# Patient Record
Sex: MALE | Race: BLACK OR AFRICAN AMERICAN
[De-identification: names, ages, dates, MRNs, and addresses within clinical notes are randomized per-mention and may not be internally consistent; named-entity substitution may affect disease eponyms.]

---

## 2018-01-24 PROBLEM — Z00.00 ENCOUNTER FOR PREVENTIVE HEALTH EXAMINATION: Status: ACTIVE | Noted: 2018-01-24

## 2018-01-30 ENCOUNTER — APPOINTMENT (OUTPATIENT)
Dept: INFECTIOUS DISEASE | Facility: CLINIC | Age: 33
End: 2018-01-30
Payer: COMMERCIAL

## 2018-01-30 VITALS
OXYGEN SATURATION: 95 % | BODY MASS INDEX: 30.51 KG/M2 | HEART RATE: 101 BPM | DIASTOLIC BLOOD PRESSURE: 96 MMHG | WEIGHT: 206 LBS | HEIGHT: 69 IN | SYSTOLIC BLOOD PRESSURE: 137 MMHG | RESPIRATION RATE: 18 BRPM | TEMPERATURE: 97.7 F

## 2018-01-30 DIAGNOSIS — Z83.3 FAMILY HISTORY OF DIABETES MELLITUS: ICD-10-CM

## 2018-01-30 PROCEDURE — 90472 IMMUNIZATION ADMIN EACH ADD: CPT

## 2018-01-30 PROCEDURE — 90670 PCV13 VACCINE IM: CPT

## 2018-01-30 PROCEDURE — 90686 IIV4 VACC NO PRSV 0.5 ML IM: CPT

## 2018-01-30 PROCEDURE — G0008: CPT

## 2018-01-30 PROCEDURE — 99204 OFFICE O/P NEW MOD 45 MIN: CPT | Mod: 25

## 2018-02-06 ENCOUNTER — CLINICAL ADVICE (OUTPATIENT)
Age: 33
End: 2018-02-06

## 2018-02-09 ENCOUNTER — LABORATORY RESULT (OUTPATIENT)
Age: 33
End: 2018-02-09

## 2018-02-09 ENCOUNTER — APPOINTMENT (OUTPATIENT)
Dept: INFECTIOUS DISEASE | Facility: CLINIC | Age: 33
End: 2018-02-09
Payer: COMMERCIAL

## 2018-02-09 VITALS
TEMPERATURE: 97.5 F | WEIGHT: 212 LBS | HEIGHT: 69 IN | OXYGEN SATURATION: 99 % | HEART RATE: 109 BPM | DIASTOLIC BLOOD PRESSURE: 104 MMHG | SYSTOLIC BLOOD PRESSURE: 150 MMHG | BODY MASS INDEX: 31.4 KG/M2

## 2018-02-09 PROCEDURE — 96372 THER/PROPH/DIAG INJ SC/IM: CPT

## 2018-02-09 PROCEDURE — 99214 OFFICE O/P EST MOD 30 MIN: CPT | Mod: 25

## 2018-02-09 RX ORDER — PENICILLIN G BENZATHINE 2400000 [IU]/4ML
2400000 INJECTION, SUSPENSION INTRAMUSCULAR
Qty: 1 | Refills: 0 | Status: COMPLETED | OUTPATIENT
Start: 2018-02-09

## 2018-02-09 RX ADMIN — PENICILLIN G BENZATHINE 0 UNIT/4ML: 2400000 INJECTION, SUSPENSION INTRAMUSCULAR at 00:00

## 2018-02-09 RX ADMIN — Medication 2.4 UNIT/4ML: at 00:00

## 2018-02-16 ENCOUNTER — APPOINTMENT (OUTPATIENT)
Dept: INFECTIOUS DISEASE | Facility: CLINIC | Age: 33
End: 2018-02-16
Payer: COMMERCIAL

## 2018-02-16 ENCOUNTER — APPOINTMENT (OUTPATIENT)
Dept: INFECTIOUS DISEASE | Facility: CLINIC | Age: 33
End: 2018-02-16

## 2018-02-16 VITALS
TEMPERATURE: 97.5 F | DIASTOLIC BLOOD PRESSURE: 101 MMHG | HEIGHT: 69 IN | SYSTOLIC BLOOD PRESSURE: 154 MMHG | BODY MASS INDEX: 32.14 KG/M2 | HEART RATE: 114 BPM | OXYGEN SATURATION: 100 % | WEIGHT: 217 LBS

## 2018-02-16 PROCEDURE — 96372 THER/PROPH/DIAG INJ SC/IM: CPT

## 2018-02-16 PROCEDURE — 99213 OFFICE O/P EST LOW 20 MIN: CPT | Mod: 25

## 2018-02-16 RX ORDER — PENICILLIN G BENZATHINE 2400000 [IU]/4ML
2400000 INJECTION, SUSPENSION INTRAMUSCULAR
Qty: 1 | Refills: 0 | Status: COMPLETED | OUTPATIENT
Start: 2018-02-09

## 2018-02-23 ENCOUNTER — APPOINTMENT (OUTPATIENT)
Dept: INFECTIOUS DISEASE | Facility: CLINIC | Age: 33
End: 2018-02-23
Payer: COMMERCIAL

## 2018-02-23 ENCOUNTER — MED ADMIN CHARGE (OUTPATIENT)
Age: 33
End: 2018-02-23

## 2018-02-23 PROCEDURE — 96372 THER/PROPH/DIAG INJ SC/IM: CPT

## 2018-02-23 RX ORDER — PENICILLIN G BENZATHINE 2400000 [IU]/4ML
2400000 INJECTION, SUSPENSION INTRAMUSCULAR
Qty: 1 | Refills: 0 | Status: COMPLETED | OUTPATIENT
Start: 2018-02-23

## 2018-04-17 ENCOUNTER — LABORATORY RESULT (OUTPATIENT)
Age: 33
End: 2018-04-17

## 2018-04-17 ENCOUNTER — APPOINTMENT (OUTPATIENT)
Dept: INFECTIOUS DISEASE | Facility: CLINIC | Age: 33
End: 2018-04-17
Payer: COMMERCIAL

## 2018-04-17 ENCOUNTER — RX RENEWAL (OUTPATIENT)
Age: 33
End: 2018-04-17

## 2018-04-17 VITALS
OXYGEN SATURATION: 99 % | WEIGHT: 238 LBS | HEIGHT: 69 IN | TEMPERATURE: 97.7 F | HEART RATE: 83 BPM | BODY MASS INDEX: 35.25 KG/M2 | SYSTOLIC BLOOD PRESSURE: 135 MMHG | DIASTOLIC BLOOD PRESSURE: 87 MMHG

## 2018-04-17 PROCEDURE — 90734 MENACWYD/MENACWYCRM VACC IM: CPT

## 2018-04-17 PROCEDURE — 99213 OFFICE O/P EST LOW 20 MIN: CPT | Mod: 25

## 2018-04-17 PROCEDURE — 90471 IMMUNIZATION ADMIN: CPT

## 2018-04-17 RX ORDER — SULFAMETHOXAZOLE AND TRIMETHOPRIM 800; 160 MG/1; MG/1
800-160 TABLET ORAL
Qty: 12 | Refills: 3 | Status: DISCONTINUED | COMMUNITY
Start: 2018-02-09 | End: 2018-04-17

## 2018-04-19 ENCOUNTER — CLINICAL ADVICE (OUTPATIENT)
Age: 33
End: 2018-04-19

## 2018-04-19 ENCOUNTER — RX RENEWAL (OUTPATIENT)
Age: 33
End: 2018-04-19

## 2018-06-12 ENCOUNTER — RX RENEWAL (OUTPATIENT)
Age: 33
End: 2018-06-12

## 2018-07-30 ENCOUNTER — LABORATORY RESULT (OUTPATIENT)
Age: 33
End: 2018-07-30

## 2018-07-30 ENCOUNTER — APPOINTMENT (OUTPATIENT)
Dept: INFECTIOUS DISEASE | Facility: CLINIC | Age: 33
End: 2018-07-30
Payer: COMMERCIAL

## 2018-07-30 VITALS
HEART RATE: 84 BPM | BODY MASS INDEX: 38.51 KG/M2 | WEIGHT: 260 LBS | TEMPERATURE: 97.9 F | DIASTOLIC BLOOD PRESSURE: 85 MMHG | OXYGEN SATURATION: 99 % | SYSTOLIC BLOOD PRESSURE: 136 MMHG | HEIGHT: 69 IN

## 2018-07-30 PROCEDURE — 99214 OFFICE O/P EST MOD 30 MIN: CPT

## 2018-08-13 ENCOUNTER — MED ADMIN CHARGE (OUTPATIENT)
Age: 33
End: 2018-08-13

## 2018-08-13 ENCOUNTER — APPOINTMENT (OUTPATIENT)
Dept: INFECTIOUS DISEASE | Facility: CLINIC | Age: 33
End: 2018-08-13
Payer: COMMERCIAL

## 2018-08-13 PROCEDURE — 96372 THER/PROPH/DIAG INJ SC/IM: CPT

## 2018-08-13 RX ORDER — PENICILLIN G BENZATHINE 2400000 [IU]/4ML
2400000 INJECTION, SUSPENSION INTRAMUSCULAR
Qty: 1 | Refills: 0 | Status: COMPLETED | OUTPATIENT
Start: 2018-08-01

## 2018-08-29 ENCOUNTER — MED ADMIN CHARGE (OUTPATIENT)
Age: 33
End: 2018-08-29

## 2018-08-29 ENCOUNTER — APPOINTMENT (OUTPATIENT)
Dept: INFECTIOUS DISEASE | Facility: CLINIC | Age: 33
End: 2018-08-29
Payer: COMMERCIAL

## 2018-08-29 PROCEDURE — 96372 THER/PROPH/DIAG INJ SC/IM: CPT

## 2018-08-29 RX ORDER — PENICILLIN G BENZATHINE 2400000 [IU]/4ML
2400000 INJECTION, SUSPENSION INTRAMUSCULAR
Qty: 1 | Refills: 0 | Status: COMPLETED | OUTPATIENT
Start: 2018-08-01

## 2018-09-06 ENCOUNTER — APPOINTMENT (OUTPATIENT)
Dept: INFECTIOUS DISEASE | Facility: CLINIC | Age: 33
End: 2018-09-06
Payer: COMMERCIAL

## 2018-09-06 PROCEDURE — 96372 THER/PROPH/DIAG INJ SC/IM: CPT

## 2018-09-06 RX ORDER — PENICILLIN G BENZATHINE 2400000 [IU]/4ML
2400000 INJECTION, SUSPENSION INTRAMUSCULAR
Qty: 1 | Refills: 0 | Status: COMPLETED | OUTPATIENT
Start: 2018-08-01

## 2018-09-13 ENCOUNTER — APPOINTMENT (OUTPATIENT)
Dept: INFECTIOUS DISEASE | Facility: CLINIC | Age: 33
End: 2018-09-13
Payer: COMMERCIAL

## 2018-09-13 ENCOUNTER — RX RENEWAL (OUTPATIENT)
Age: 33
End: 2018-09-13

## 2018-09-13 PROCEDURE — 96372 THER/PROPH/DIAG INJ SC/IM: CPT

## 2018-09-13 RX ORDER — PENICILLIN G BENZATHINE 2400000 [IU]/4ML
2400000 INJECTION, SUSPENSION INTRAMUSCULAR
Qty: 1 | Refills: 0 | Status: COMPLETED | OUTPATIENT
Start: 2018-08-21

## 2018-10-29 ENCOUNTER — APPOINTMENT (OUTPATIENT)
Dept: INFECTIOUS DISEASE | Facility: CLINIC | Age: 33
End: 2018-10-29
Payer: COMMERCIAL

## 2018-10-29 ENCOUNTER — RX RENEWAL (OUTPATIENT)
Age: 33
End: 2018-10-29

## 2018-10-29 VITALS
DIASTOLIC BLOOD PRESSURE: 89 MMHG | HEART RATE: 88 BPM | OXYGEN SATURATION: 96 % | BODY MASS INDEX: 42.21 KG/M2 | TEMPERATURE: 97.7 F | SYSTOLIC BLOOD PRESSURE: 140 MMHG | WEIGHT: 285 LBS | HEIGHT: 69 IN

## 2018-10-29 PROCEDURE — G0008: CPT

## 2018-10-29 PROCEDURE — 99214 OFFICE O/P EST MOD 30 MIN: CPT | Mod: 25

## 2018-10-29 PROCEDURE — 90686 IIV4 VACC NO PRSV 0.5 ML IM: CPT

## 2018-10-29 PROCEDURE — 90732 PPSV23 VACC 2 YRS+ SUBQ/IM: CPT

## 2018-10-29 PROCEDURE — 90472 IMMUNIZATION ADMIN EACH ADD: CPT

## 2018-10-31 ENCOUNTER — CLINICAL ADVICE (OUTPATIENT)
Age: 33
End: 2018-10-31

## 2018-11-15 ENCOUNTER — CLINICAL ADVICE (OUTPATIENT)
Age: 33
End: 2018-11-15

## 2019-01-11 ENCOUNTER — RX RENEWAL (OUTPATIENT)
Age: 34
End: 2019-01-11

## 2019-01-11 RX ORDER — ATOVAQUONE 750 MG/5ML
750 SUSPENSION ORAL
Qty: 1 | Refills: 0 | Status: COMPLETED | COMMUNITY
Start: 2018-04-17 | End: 2019-01-11

## 2019-02-06 ENCOUNTER — RX RENEWAL (OUTPATIENT)
Age: 34
End: 2019-02-06

## 2019-05-10 ENCOUNTER — RX RENEWAL (OUTPATIENT)
Age: 34
End: 2019-05-10

## 2019-05-14 ENCOUNTER — APPOINTMENT (OUTPATIENT)
Dept: INFECTIOUS DISEASE | Facility: CLINIC | Age: 34
End: 2019-05-14
Payer: COMMERCIAL

## 2019-05-14 VITALS
TEMPERATURE: 98.6 F | DIASTOLIC BLOOD PRESSURE: 94 MMHG | SYSTOLIC BLOOD PRESSURE: 155 MMHG | HEART RATE: 85 BPM | WEIGHT: 306 LBS | BODY MASS INDEX: 45.19 KG/M2 | OXYGEN SATURATION: 97 %

## 2019-05-14 DIAGNOSIS — Z72.51 HIGH RISK HETEROSEXUAL BEHAVIOR: ICD-10-CM

## 2019-05-14 PROCEDURE — 90715 TDAP VACCINE 7 YRS/> IM: CPT

## 2019-05-14 PROCEDURE — 90471 IMMUNIZATION ADMIN: CPT

## 2019-05-14 PROCEDURE — 99214 OFFICE O/P EST MOD 30 MIN: CPT | Mod: 25

## 2019-05-14 RX ORDER — ELVITEGRAVIR, COBICISTAT, EMTRICITABINE, AND TENOFOVIR ALAFENAMIDE 150; 150; 200; 10 MG/1; MG/1; MG/1; MG/1
150-150-200-10 TABLET ORAL
Qty: 30 | Refills: 0 | Status: DISCONTINUED | COMMUNITY
Start: 2018-01-30 | End: 2019-05-14

## 2019-05-14 NOTE — HISTORY OF PRESENT ILLNESS
[Sexually Active] : The patient is sexually active [Oral] : oral [Anal] : anal [Men] : with men [FreeTextEntry1] : 32 yo man who presented with advanced AIds and multiple OIs about 18 months ago. He has been steadily improving since starting ARVs\par He has been taking Genvoya without side effects noted\par \par He has a history of confirmed crysptosporidial diarrhea- sxs. improved with ARVS\par \par He has a history of syphilis and is s/p treatment and will need repeat titers\par \par He drives long haul 18wheel trucks\par \par C/o weight gain over 300lbs at this point [Monogamous] : is not monogamous [de-identified] :  [de-identified] : mom

## 2019-05-14 NOTE — ASSESSMENT
[Treatment Education] : treatment education [Rx Dose / Side Effects] : Rx dose/side effects [Treatment Adherence] : treatment adherence [Risk Reduction] : risk reduction [Universal Precautions] : universal precautions [Nutritional / Food Issues] : nutritional/food issues [Medical Care Issues] : medical care issues [Drug Interactions / Side Effects] : drug interactions/side effects [Disclosure of Diagnosis] : disclosure of diagnosis [HIV Education] : HIV Education [Sexuality / Safer Sex] : sexuality/safer sex [Partner Notification Info/Discussion] : partner notification info/discussion [EtOH Counseling] : EtOH counseling [Anticipatory Guidance] : anticipatory guidance [Smoking Cessation] : smoking cessation [FreeTextEntry1] : 32 yo man with a history of a recent presentation of advanced AIDs, but steadily improving with ARVs and OI meds\par \par Will try to switch Genvoya to Biktarvy\par \par Poor dentition needs multiple tooth extractions- dental referral has a private dentist\par \par HIV- needs repeat labs today CBc, Cd4, VL, SMAC\par annual labs as well and titers for HAV, HBV, measles\par \par RPR- s/p treatment x 4 doses will need repeat titer today and may need further treatment given CD4\par \par cryptosporidial diarrhea- sxs. improved with ARVs no proven effective primary therapy but if sxs. recur will try Nitazoxinide\par \par needs Tdap\par \par needs nutritionist referral for diet and exercise weight loss program\par \par RPR still at 1:32- Patient was  unable to RTC for PCN injections will try oral doxycycline 100mg bid x 28days  and will see what titer shows today\par \par RTC in 4 mo

## 2019-05-14 NOTE — PHYSICAL EXAM
[General Appearance - Alert] : alert [General Appearance - In No Acute Distress] : in no acute distress [Sclera] : the sclera and conjunctiva were normal [PERRL With Normal Accommodation] : pupils were equal in size, round, reactive to light [Extraocular Movements] : extraocular movements were intact [Oropharynx] : the oropharynx was normal with no thrush [Outer Ear] : the ears and nose were normal in appearance [Neck Appearance] : the appearance of the neck was normal [Neck Cervical Mass (___cm)] : no neck mass was observed [Thyroid Diffuse Enlargement] : the thyroid was not enlarged [Jugular Venous Distention Increased] : there was no jugular-venous distention [Heart Rate And Rhythm] : heart rate was normal and rhythm regular [Auscultation Breath Sounds / Voice Sounds] : lungs were clear to auscultation bilaterally [Heart Sounds] : normal S1 and S2 [Heart Sounds Gallop] : no gallops [Murmurs] : no murmurs [Heart Sounds Pericardial Friction Rub] : no pericardial rub [Edema] : there was no peripheral edema [Full Pulse] : the pedal pulses are present [Bowel Sounds] : normal bowel sounds [Abdomen Soft] : soft [Abdomen Tenderness] : non-tender [Abdomen Mass (___ Cm)] : no abdominal mass palpated [Costovertebral Angle Tenderness] : no CVA tenderness [Penis Abnormality] : the penis was normal [Scrotum] : the scrotum was normal [Testes Swelling] : the testicles were not swollen [Testes Mass (___cm)] : there were no testicular masses [No Palpable Adenopathy] : no palpable adenopathy [Musculoskeletal - Swelling] : no joint swelling [Nail Clubbing] : no clubbing  or cyanosis of the fingernails [Motor Tone] : muscle strength and tone were normal [Skin Color & Pigmentation] : normal skin color and pigmentation [Deep Tendon Reflexes (DTR)] : deep tendon reflexes were 2+ and symmetric [] : no rash [Sensation] : the sensory exam was normal to light touch and pinprick [No Focal Deficits] : no focal deficits [Oriented To Time, Place, And Person] : oriented to person, place, and time [Affect] : the affect was normal

## 2019-05-16 ENCOUNTER — CLINICAL ADVICE (OUTPATIENT)
Age: 34
End: 2019-05-16

## 2019-05-24 ENCOUNTER — RX RENEWAL (OUTPATIENT)
Age: 34
End: 2019-05-24

## 2019-05-31 ENCOUNTER — APPOINTMENT (OUTPATIENT)
Dept: INFECTIOUS DISEASE | Facility: CLINIC | Age: 34
End: 2019-05-31

## 2019-06-10 ENCOUNTER — RX RENEWAL (OUTPATIENT)
Age: 34
End: 2019-06-10

## 2019-07-29 ENCOUNTER — RX RENEWAL (OUTPATIENT)
Age: 34
End: 2019-07-29

## 2019-09-03 ENCOUNTER — LABORATORY RESULT (OUTPATIENT)
Age: 34
End: 2019-09-03

## 2019-09-03 ENCOUNTER — APPOINTMENT (OUTPATIENT)
Dept: INFECTIOUS DISEASE | Facility: CLINIC | Age: 34
End: 2019-09-03
Payer: COMMERCIAL

## 2019-09-03 VITALS
RESPIRATION RATE: 18 BRPM | SYSTOLIC BLOOD PRESSURE: 145 MMHG | HEART RATE: 78 BPM | DIASTOLIC BLOOD PRESSURE: 96 MMHG | TEMPERATURE: 97.7 F | WEIGHT: 312 LBS | HEIGHT: 69 IN | OXYGEN SATURATION: 94 % | BODY MASS INDEX: 46.21 KG/M2

## 2019-09-03 PROCEDURE — 99214 OFFICE O/P EST MOD 30 MIN: CPT

## 2019-09-03 RX ORDER — LOSARTAN POTASSIUM 50 MG/1
50 TABLET, FILM COATED ORAL
Qty: 30 | Refills: 3 | Status: DISCONTINUED | COMMUNITY
Start: 2019-09-03 | End: 2019-09-03

## 2019-09-03 NOTE — PHYSICAL EXAM
[General Appearance - Alert] : alert [General Appearance - In No Acute Distress] : in no acute distress [Sclera] : the sclera and conjunctiva were normal [PERRL With Normal Accommodation] : pupils were equal in size, round, reactive to light [Extraocular Movements] : extraocular movements were intact [Outer Ear] : the ears and nose were normal in appearance [Oropharynx] : the oropharynx was normal with no thrush [Neck Appearance] : the appearance of the neck was normal [Neck Cervical Mass (___cm)] : no neck mass was observed [Jugular Venous Distention Increased] : there was no jugular-venous distention [Thyroid Diffuse Enlargement] : the thyroid was not enlarged [Auscultation Breath Sounds / Voice Sounds] : lungs were clear to auscultation bilaterally [Heart Sounds] : normal S1 and S2 [Heart Rate And Rhythm] : heart rate was normal and rhythm regular [Heart Sounds Gallop] : no gallops [Murmurs] : no murmurs [Heart Sounds Pericardial Friction Rub] : no pericardial rub [Full Pulse] : the pedal pulses are present [Bowel Sounds] : normal bowel sounds [Edema] : there was no peripheral edema [Abdomen Soft] : soft [Abdomen Tenderness] : non-tender [Abdomen Mass (___ Cm)] : no abdominal mass palpated [Costovertebral Angle Tenderness] : no CVA tenderness [Penis Abnormality] : the penis was normal [Testes Swelling] : the testicles were not swollen [Scrotum] : the scrotum was normal [Testes Mass (___cm)] : there were no testicular masses [No Palpable Adenopathy] : no palpable adenopathy [Musculoskeletal - Swelling] : no joint swelling [Nail Clubbing] : no clubbing  or cyanosis of the fingernails [Motor Tone] : muscle strength and tone were normal [Skin Color & Pigmentation] : normal skin color and pigmentation [] : no rash [Deep Tendon Reflexes (DTR)] : deep tendon reflexes were 2+ and symmetric [No Focal Deficits] : no focal deficits [Sensation] : the sensory exam was normal to light touch and pinprick [Oriented To Time, Place, And Person] : oriented to person, place, and time [Affect] : the affect was normal

## 2019-09-04 NOTE — HISTORY OF PRESENT ILLNESS
[Sexually Active] : The patient is sexually active [Oral] : oral [Anal] : anal [Men] : with men [FreeTextEntry1] : 32 yo man who presented with advanced AIds and multiple OIs about 18 months ago. He has been steadily improving since starting ARVs\par He has been taking Genvoya without side effects noted\par \par He has a history of confirmed crysptosporidial diarrhea- sxs. improved with ARVS\par \par He has a history of syphilis and is s/p treatment and will need repeat titers\par \par He drives long haul 18wheel trucks\par \par C/o weight gain over 300lbs at this point [Monogamous] : is not monogamous [de-identified] :  [de-identified] : mom

## 2019-09-04 NOTE — ASSESSMENT
[Treatment Education] : treatment education [Treatment Adherence] : treatment adherence [Rx Dose / Side Effects] : Rx dose/side effects [Risk Reduction] : risk reduction [Nutritional / Food Issues] : nutritional/food issues [Universal Precautions] : universal precautions [Medical Care Issues] : medical care issues [Disclosure of Diagnosis] : disclosure of diagnosis [Drug Interactions / Side Effects] : drug interactions/side effects [HIV Education] : HIV Education [Sexuality / Safer Sex] : sexuality/safer sex [Anticipatory Guidance] : anticipatory guidance [Education Materials Provided] : Eduction materials were provided [FreeTextEntry1] : 32 yo man with a history of a recent presentation of advanced AIDs, but steadily improving with ARVs and OI meds\par \par Will try to switch Genvoya to Biktarvy\par \par Poor dentition needs multiple tooth extractions- dental referral has a private dentist\par \par HIV- needs repeat labs today CBc, Cd4, VL, SMAC\par annual labs as well and titers for HAV, HBV, measles\par \par RPR- s/p treatment x 4 doses will need repeat titer today and may need further treatment given CD4\par \par cryptosporidial diarrhea- sxs. improved with ARVs no proven effective primary therapy but if sxs. recur will try Nitazoxinide\par \par HTN- needs diet and exercise and weight loss will see nutritiionist today but will also start a BP med Losartan-hctz and repeat BP in 1 week. If not effective at reducing will need to try norvasc agent\par \par needs nutritionist referral for diet and exercise weight loss program\par \par RPR still at 1:16- Patient was  unable to RTC for PCN injections due to work schedule was given oral doxycycline 100mg bid x 28days  and will see what titer shows today\par \par RTC in 4 mo

## 2019-09-16 ENCOUNTER — CLINICAL ADVICE (OUTPATIENT)
Age: 34
End: 2019-09-16

## 2019-09-20 ENCOUNTER — RX RENEWAL (OUTPATIENT)
Age: 34
End: 2019-09-20

## 2019-11-01 ENCOUNTER — APPOINTMENT (OUTPATIENT)
Dept: INFECTIOUS DISEASE | Facility: CLINIC | Age: 34
End: 2019-11-01

## 2019-11-25 ENCOUNTER — RX RENEWAL (OUTPATIENT)
Age: 34
End: 2019-11-25

## 2020-01-13 ENCOUNTER — APPOINTMENT (OUTPATIENT)
Dept: INFECTIOUS DISEASE | Facility: CLINIC | Age: 35
End: 2020-01-13

## 2020-01-24 ENCOUNTER — RX RENEWAL (OUTPATIENT)
Age: 35
End: 2020-01-24

## 2020-02-23 ENCOUNTER — RX RENEWAL (OUTPATIENT)
Age: 35
End: 2020-02-23

## 2020-03-26 ENCOUNTER — RX RENEWAL (OUTPATIENT)
Age: 35
End: 2020-03-26

## 2020-04-17 ENCOUNTER — RX RENEWAL (OUTPATIENT)
Age: 35
End: 2020-04-17

## 2020-04-29 ENCOUNTER — APPOINTMENT (OUTPATIENT)
Dept: INFECTIOUS DISEASE | Facility: CLINIC | Age: 35
End: 2020-04-29

## 2020-04-29 ENCOUNTER — APPOINTMENT (OUTPATIENT)
Dept: INFECTIOUS DISEASE | Facility: CLINIC | Age: 35
End: 2020-04-29
Payer: COMMERCIAL

## 2020-04-29 DIAGNOSIS — E55.9 VITAMIN D DEFICIENCY, UNSPECIFIED: ICD-10-CM

## 2020-04-29 DIAGNOSIS — R73.09 OTHER ABNORMAL GLUCOSE: ICD-10-CM

## 2020-04-29 DIAGNOSIS — A53.9 SYPHILIS, UNSPECIFIED: ICD-10-CM

## 2020-04-29 PROCEDURE — 99442: CPT

## 2020-05-18 ENCOUNTER — RX RENEWAL (OUTPATIENT)
Age: 35
End: 2020-05-18

## 2020-07-20 ENCOUNTER — APPOINTMENT (OUTPATIENT)
Dept: INFECTIOUS DISEASE | Facility: CLINIC | Age: 35
End: 2020-07-20

## 2020-09-04 ENCOUNTER — RX RENEWAL (OUTPATIENT)
Age: 35
End: 2020-09-04

## 2020-09-13 ENCOUNTER — FORM ENCOUNTER (OUTPATIENT)
Age: 35
End: 2020-09-13

## 2020-09-14 ENCOUNTER — APPOINTMENT (OUTPATIENT)
Dept: INFECTIOUS DISEASE | Facility: CLINIC | Age: 35
End: 2020-09-14
Payer: COMMERCIAL

## 2020-09-14 VITALS
OXYGEN SATURATION: 19 % | WEIGHT: 296 LBS | TEMPERATURE: 97.7 F | BODY MASS INDEX: 43.71 KG/M2 | HEART RATE: 99 BPM | DIASTOLIC BLOOD PRESSURE: 96 MMHG | SYSTOLIC BLOOD PRESSURE: 160 MMHG

## 2020-09-14 DIAGNOSIS — Z23 ENCOUNTER FOR IMMUNIZATION: ICD-10-CM

## 2020-09-14 PROCEDURE — 99214 OFFICE O/P EST MOD 30 MIN: CPT

## 2020-09-14 NOTE — PHYSICAL EXAM
[General Appearance - Alert] : alert [General Appearance - In No Acute Distress] : in no acute distress [Sclera] : the sclera and conjunctiva were normal [PERRL With Normal Accommodation] : pupils were equal in size, round, reactive to light [Extraocular Movements] : extraocular movements were intact [Outer Ear] : the ears and nose were normal in appearance [Oropharynx] : the oropharynx was normal with no thrush [Neck Appearance] : the appearance of the neck was normal [Neck Cervical Mass (___cm)] : no neck mass was observed [Jugular Venous Distention Increased] : there was no jugular-venous distention [Thyroid Diffuse Enlargement] : the thyroid was not enlarged [Heart Rate And Rhythm] : heart rate was normal and rhythm regular [Auscultation Breath Sounds / Voice Sounds] : lungs were clear to auscultation bilaterally [Heart Sounds Gallop] : no gallops [Heart Sounds] : normal S1 and S2 [Heart Sounds Pericardial Friction Rub] : no pericardial rub [Murmurs] : no murmurs [Full Pulse] : the pedal pulses are present [Edema] : there was no peripheral edema [Bowel Sounds] : normal bowel sounds [Abdomen Soft] : soft [Abdomen Tenderness] : non-tender [Abdomen Mass (___ Cm)] : no abdominal mass palpated [Costovertebral Angle Tenderness] : no CVA tenderness [Penis Abnormality] : the penis was normal [Scrotum] : the scrotum was normal [Testes Swelling] : the testicles were not swollen [Testes Mass (___cm)] : there were no testicular masses [No Palpable Adenopathy] : no palpable adenopathy [Musculoskeletal - Swelling] : no joint swelling [Nail Clubbing] : no clubbing  or cyanosis of the fingernails [Motor Tone] : muscle strength and tone were normal [Skin Color & Pigmentation] : normal skin color and pigmentation [] : no rash [Deep Tendon Reflexes (DTR)] : deep tendon reflexes were 2+ and symmetric [Sensation] : the sensory exam was normal to light touch and pinprick [No Focal Deficits] : no focal deficits [Oriented To Time, Place, And Person] : oriented to person, place, and time [Affect] : the affect was normal

## 2020-09-16 NOTE — HISTORY OF PRESENT ILLNESS
[Sexually Active] : The patient is sexually active [Oral] : oral [Anal] : anal [Men] : with men [FreeTextEntry1] : 34 yo man who presented with advanced AIds and multiple OIs a few years ago. He has been steadily improving since starting ARVs\par He has been taking Biktarvy without side effects noted\par \par He has a history of confirmed crysptosporidial diarrhea- sxs. improved with ARVS\par \par He has a history of syphilis and is s/p treatment and will need repeat titers\par \par He drives long haul 18wheel trucks\par \par C/o weight gain over 300lbs at this point [Monogamous] : is not monogamous [de-identified] :  [de-identified] : mom

## 2020-09-16 NOTE — ASSESSMENT
[Treatment Education] : treatment education [Treatment Adherence] : treatment adherence [Rx Dose / Side Effects] : Rx dose/side effects [Risk Reduction] : risk reduction [Nutritional / Food Issues] : nutritional/food issues [Universal Precautions] : universal precautions [Medical Care Issues] : medical care issues [Drug Interactions / Side Effects] : drug interactions/side effects [Disclosure of Diagnosis] : disclosure of diagnosis [HIV Education] : HIV Education [Sexuality / Safer Sex] : sexuality/safer sex [Anticipatory Guidance] : anticipatory guidance [Education Materials Provided] : Eduction materials were provided [FreeTextEntry1] : 36 yo man with a history of a recent presentation of advanced AIDs, but steadily improving with ARVs and OI meds\par \par Will continue Biktarvy\par \par Poor dentition needs multiple tooth extractions- dental referral has a private dentist\par \par HIV- needs repeat labs today CBc, Cd4, VL, SMAC\par annual labs as well and titers for HAV, HBV, measles\par flu vaccine\par \par RPR- s/p treatment x 4 doses will need repeat titer today to monitor and for STI reinfection\par \par cryptosporidial diarrhea- resolved\par \par HTN- needs diet and exercise and weight loss will see nutritiionist today but will also start a BP med Losartan-hctz and repeat BP at home or in clinic in a few weeks\par \par needs nutritionist referral for diet and exercise weight loss program\par \par COVID risks and avoidance discussed\par \par RTC in 4 mo

## 2020-09-17 RX ORDER — AZITHROMYCIN 600 MG/1
600 TABLET, FILM COATED ORAL
Qty: 8 | Refills: 0 | Status: DISCONTINUED | COMMUNITY
Start: 2018-02-09 | End: 2020-09-17

## 2020-09-23 ENCOUNTER — INPATIENT (INPATIENT)
Facility: HOSPITAL | Age: 35
LOS: 2 days | Discharge: HOME | DRG: 638 | End: 2020-09-26
Attending: INTERNAL MEDICINE | Admitting: INTERNAL MEDICINE
Payer: COMMERCIAL

## 2020-09-23 VITALS
WEIGHT: 283.96 LBS | DIASTOLIC BLOOD PRESSURE: 92 MMHG | HEIGHT: 70 IN | HEART RATE: 102 BPM | SYSTOLIC BLOOD PRESSURE: 144 MMHG | RESPIRATION RATE: 20 BRPM | OXYGEN SATURATION: 97 % | TEMPERATURE: 98 F

## 2020-09-23 DIAGNOSIS — E11.10 TYPE 2 DIABETES MELLITUS WITH KETOACIDOSIS WITHOUT COMA: ICD-10-CM

## 2020-09-23 DIAGNOSIS — E78.49 OTHER HYPERLIPIDEMIA: ICD-10-CM

## 2020-09-23 DIAGNOSIS — I10 ESSENTIAL (PRIMARY) HYPERTENSION: ICD-10-CM

## 2020-09-23 DIAGNOSIS — R73.9 HYPERGLYCEMIA, UNSPECIFIED: ICD-10-CM

## 2020-09-23 LAB
A1C WITH ESTIMATED AVERAGE GLUCOSE RESULT: 11.4 % — HIGH (ref 4–5.6)
ALBUMIN SERPL ELPH-MCNC: 4.9 G/DL — SIGNIFICANT CHANGE UP (ref 3.3–5)
ALP SERPL-CCNC: 88 U/L — SIGNIFICANT CHANGE UP (ref 40–120)
ALT FLD-CCNC: 32 U/L — SIGNIFICANT CHANGE UP (ref 10–45)
ANION GAP SERPL CALC-SCNC: 18 MMOL/L — HIGH (ref 5–17)
APPEARANCE UR: CLEAR — SIGNIFICANT CHANGE UP
AST SERPL-CCNC: 56 U/L — HIGH (ref 10–40)
B-OH-BUTYR SERPL-SCNC: 1.6 MMOL/L — HIGH
B-OH-BUTYR SERPL-SCNC: 1.8 MMOL/L — HIGH
BACTERIA # UR AUTO: NEGATIVE — SIGNIFICANT CHANGE UP
BASE EXCESS BLDV CALC-SCNC: 0.4 MMOL/L — SIGNIFICANT CHANGE UP (ref -2–2)
BASOPHILS # BLD AUTO: 0.04 K/UL — SIGNIFICANT CHANGE UP (ref 0–0.2)
BASOPHILS NFR BLD AUTO: 0.4 % — SIGNIFICANT CHANGE UP (ref 0–2)
BILIRUB SERPL-MCNC: 0.5 MG/DL — SIGNIFICANT CHANGE UP (ref 0.2–1.2)
BILIRUB UR-MCNC: NEGATIVE — SIGNIFICANT CHANGE UP
BUN SERPL-MCNC: 21 MG/DL — SIGNIFICANT CHANGE UP (ref 7–23)
CA-I SERPL-SCNC: 1.24 MMOL/L — SIGNIFICANT CHANGE UP (ref 1.12–1.3)
CALCIUM SERPL-MCNC: 10.4 MG/DL — SIGNIFICANT CHANGE UP (ref 8.4–10.5)
CHLORIDE BLDV-SCNC: 94 MMOL/L — LOW (ref 96–108)
CHLORIDE SERPL-SCNC: 90 MMOL/L — LOW (ref 96–108)
CO2 BLDV-SCNC: 28 MMOL/L — SIGNIFICANT CHANGE UP (ref 22–30)
CO2 SERPL-SCNC: 24 MMOL/L — SIGNIFICANT CHANGE UP (ref 22–31)
COLOR SPEC: COLORLESS — SIGNIFICANT CHANGE UP
CREAT SERPL-MCNC: 1.25 MG/DL — SIGNIFICANT CHANGE UP (ref 0.5–1.3)
DIFF PNL FLD: NEGATIVE — SIGNIFICANT CHANGE UP
EOSINOPHIL # BLD AUTO: 0.12 K/UL — SIGNIFICANT CHANGE UP (ref 0–0.5)
EOSINOPHIL NFR BLD AUTO: 1.3 % — SIGNIFICANT CHANGE UP (ref 0–6)
EPI CELLS # UR: 1 /HPF — SIGNIFICANT CHANGE UP
ESTIMATED AVERAGE GLUCOSE: 280 MG/DL — HIGH (ref 68–114)
GAS PNL BLDV: 132 MMOL/L — LOW (ref 135–145)
GAS PNL BLDV: SIGNIFICANT CHANGE UP
GLUCOSE BLDC GLUCOMTR-MCNC: 294 MG/DL — HIGH (ref 70–99)
GLUCOSE BLDC GLUCOMTR-MCNC: 327 MG/DL — HIGH (ref 70–99)
GLUCOSE BLDC GLUCOMTR-MCNC: 363 MG/DL — HIGH (ref 70–99)
GLUCOSE BLDC GLUCOMTR-MCNC: 373 MG/DL — HIGH (ref 70–99)
GLUCOSE BLDV-MCNC: 587 MG/DL — CRITICAL HIGH (ref 70–99)
GLUCOSE SERPL-MCNC: 590 MG/DL — CRITICAL HIGH (ref 70–99)
GLUCOSE UR QL: ABNORMAL
HCO3 BLDV-SCNC: 26 MMOL/L — SIGNIFICANT CHANGE UP (ref 21–29)
HCT VFR BLD CALC: 42.9 % — SIGNIFICANT CHANGE UP (ref 39–50)
HCT VFR BLDA CALC: 48 % — SIGNIFICANT CHANGE UP (ref 39–50)
HGB BLD CALC-MCNC: 15.5 G/DL — SIGNIFICANT CHANGE UP (ref 13–17)
HGB BLD-MCNC: 15.4 G/DL — SIGNIFICANT CHANGE UP (ref 13–17)
HYALINE CASTS # UR AUTO: 0 /LPF — SIGNIFICANT CHANGE UP (ref 0–2)
IMM GRANULOCYTES NFR BLD AUTO: 0.2 % — SIGNIFICANT CHANGE UP (ref 0–1.5)
KETONES UR-MCNC: ABNORMAL
LACTATE BLDV-MCNC: 1.7 MMOL/L — SIGNIFICANT CHANGE UP (ref 0.7–2)
LEUKOCYTE ESTERASE UR-ACNC: ABNORMAL
LYMPHOCYTES # BLD AUTO: 4.26 K/UL — HIGH (ref 1–3.3)
LYMPHOCYTES # BLD AUTO: 47.8 % — HIGH (ref 13–44)
MAGNESIUM SERPL-MCNC: 2.1 MG/DL — SIGNIFICANT CHANGE UP (ref 1.6–2.6)
MCHC RBC-ENTMCNC: 28.9 PG — SIGNIFICANT CHANGE UP (ref 27–34)
MCHC RBC-ENTMCNC: 35.9 GM/DL — SIGNIFICANT CHANGE UP (ref 32–36)
MCV RBC AUTO: 80.5 FL — SIGNIFICANT CHANGE UP (ref 80–100)
MONOCYTES # BLD AUTO: 0.44 K/UL — SIGNIFICANT CHANGE UP (ref 0–0.9)
MONOCYTES NFR BLD AUTO: 4.9 % — SIGNIFICANT CHANGE UP (ref 2–14)
NEUTROPHILS # BLD AUTO: 4.03 K/UL — SIGNIFICANT CHANGE UP (ref 1.8–7.4)
NEUTROPHILS NFR BLD AUTO: 45.4 % — SIGNIFICANT CHANGE UP (ref 43–77)
NITRITE UR-MCNC: NEGATIVE — SIGNIFICANT CHANGE UP
NRBC # BLD: 0 /100 WBCS — SIGNIFICANT CHANGE UP (ref 0–0)
PCO2 BLDV: 48 MMHG — SIGNIFICANT CHANGE UP (ref 35–50)
PH BLDV: 7.36 — SIGNIFICANT CHANGE UP (ref 7.35–7.45)
PH UR: 6 — SIGNIFICANT CHANGE UP (ref 5–8)
PLATELET # BLD AUTO: 293 K/UL — SIGNIFICANT CHANGE UP (ref 150–400)
PO2 BLDV: 44 MMHG — SIGNIFICANT CHANGE UP (ref 25–45)
POTASSIUM BLDV-SCNC: 4.2 MMOL/L — SIGNIFICANT CHANGE UP (ref 3.5–5.3)
POTASSIUM SERPL-MCNC: 4.4 MMOL/L — SIGNIFICANT CHANGE UP (ref 3.5–5.3)
POTASSIUM SERPL-SCNC: 4.4 MMOL/L — SIGNIFICANT CHANGE UP (ref 3.5–5.3)
PROT SERPL-MCNC: 8.8 G/DL — HIGH (ref 6–8.3)
PROT UR-MCNC: NEGATIVE — SIGNIFICANT CHANGE UP
RBC # BLD: 5.33 M/UL — SIGNIFICANT CHANGE UP (ref 4.2–5.8)
RBC # FLD: 12.6 % — SIGNIFICANT CHANGE UP (ref 10.3–14.5)
RBC CASTS # UR COMP ASSIST: 3 /HPF — SIGNIFICANT CHANGE UP (ref 0–4)
SAO2 % BLDV: 77 % — SIGNIFICANT CHANGE UP (ref 67–88)
SARS-COV-2 RNA SPEC QL NAA+PROBE: SIGNIFICANT CHANGE UP
SODIUM SERPL-SCNC: 132 MMOL/L — LOW (ref 135–145)
SP GR SPEC: 1.02 — SIGNIFICANT CHANGE UP (ref 1.01–1.02)
TRIGL SERPL-MCNC: 723 MG/DL — HIGH (ref 10–149)
UROBILINOGEN FLD QL: NEGATIVE — SIGNIFICANT CHANGE UP
WBC # BLD: 8.91 K/UL — SIGNIFICANT CHANGE UP (ref 3.8–10.5)
WBC # FLD AUTO: 8.91 K/UL — SIGNIFICANT CHANGE UP (ref 3.8–10.5)
WBC UR QL: 16 /HPF — HIGH (ref 0–5)

## 2020-09-23 PROCEDURE — 71045 X-RAY EXAM CHEST 1 VIEW: CPT | Mod: 26

## 2020-09-23 PROCEDURE — 99285 EMERGENCY DEPT VISIT HI MDM: CPT

## 2020-09-23 PROCEDURE — 93010 ELECTROCARDIOGRAM REPORT: CPT | Mod: NC

## 2020-09-23 PROCEDURE — 99223 1ST HOSP IP/OBS HIGH 75: CPT | Mod: GC

## 2020-09-23 RX ORDER — INSULIN GLARGINE 100 [IU]/ML
6 INJECTION, SOLUTION SUBCUTANEOUS ONCE
Refills: 0 | Status: COMPLETED | OUTPATIENT
Start: 2020-09-23 | End: 2020-09-23

## 2020-09-23 RX ORDER — INSULIN LISPRO 100/ML
VIAL (ML) SUBCUTANEOUS EVERY 4 HOURS
Refills: 0 | Status: DISCONTINUED | OUTPATIENT
Start: 2020-09-23 | End: 2020-09-24

## 2020-09-23 RX ORDER — INSULIN GLARGINE 100 [IU]/ML
32 INJECTION, SOLUTION SUBCUTANEOUS
Refills: 0 | Status: DISCONTINUED | OUTPATIENT
Start: 2020-09-24 | End: 2020-09-25

## 2020-09-23 RX ORDER — SODIUM CHLORIDE 9 MG/ML
1000 INJECTION, SOLUTION INTRAVENOUS
Refills: 0 | Status: DISCONTINUED | OUTPATIENT
Start: 2020-09-23 | End: 2020-09-26

## 2020-09-23 RX ORDER — CEFTRIAXONE 500 MG/1
1000 INJECTION, POWDER, FOR SOLUTION INTRAMUSCULAR; INTRAVENOUS EVERY 24 HOURS
Refills: 0 | Status: DISCONTINUED | OUTPATIENT
Start: 2020-09-23 | End: 2020-09-26

## 2020-09-23 RX ORDER — DEXTROSE 50 % IN WATER 50 %
12.5 SYRINGE (ML) INTRAVENOUS ONCE
Refills: 0 | Status: DISCONTINUED | OUTPATIENT
Start: 2020-09-23 | End: 2020-09-26

## 2020-09-23 RX ORDER — SODIUM CHLORIDE 9 MG/ML
1000 INJECTION, SOLUTION INTRAVENOUS ONCE
Refills: 0 | Status: COMPLETED | OUTPATIENT
Start: 2020-09-23 | End: 2020-09-23

## 2020-09-23 RX ORDER — DEXTROSE 50 % IN WATER 50 %
25 SYRINGE (ML) INTRAVENOUS ONCE
Refills: 0 | Status: DISCONTINUED | OUTPATIENT
Start: 2020-09-23 | End: 2020-09-26

## 2020-09-23 RX ORDER — ACETAMINOPHEN 500 MG
650 TABLET ORAL EVERY 6 HOURS
Refills: 0 | Status: DISCONTINUED | OUTPATIENT
Start: 2020-09-23 | End: 2020-09-26

## 2020-09-23 RX ORDER — BICTEGRAVIR SODIUM, EMTRICITABINE, AND TENOFOVIR ALAFENAMIDE FUMARATE 30; 120; 15 MG/1; MG/1; MG/1
1 TABLET ORAL DAILY
Refills: 0 | Status: DISCONTINUED | OUTPATIENT
Start: 2020-09-23 | End: 2020-09-26

## 2020-09-23 RX ORDER — GLUCAGON INJECTION, SOLUTION 0.5 MG/.1ML
1 INJECTION, SOLUTION SUBCUTANEOUS ONCE
Refills: 0 | Status: DISCONTINUED | OUTPATIENT
Start: 2020-09-23 | End: 2020-09-26

## 2020-09-23 RX ORDER — LOSARTAN POTASSIUM 100 MG/1
100 TABLET, FILM COATED ORAL DAILY
Refills: 0 | Status: DISCONTINUED | OUTPATIENT
Start: 2020-09-23 | End: 2020-09-26

## 2020-09-23 RX ORDER — ASPIRIN/CALCIUM CARB/MAGNESIUM 324 MG
81 TABLET ORAL DAILY
Refills: 0 | Status: DISCONTINUED | OUTPATIENT
Start: 2020-09-23 | End: 2020-09-26

## 2020-09-23 RX ORDER — SODIUM CHLORIDE 9 MG/ML
1000 INJECTION INTRAMUSCULAR; INTRAVENOUS; SUBCUTANEOUS
Refills: 0 | Status: DISCONTINUED | OUTPATIENT
Start: 2020-09-23 | End: 2020-09-26

## 2020-09-23 RX ORDER — DEXTROSE 50 % IN WATER 50 %
15 SYRINGE (ML) INTRAVENOUS ONCE
Refills: 0 | Status: DISCONTINUED | OUTPATIENT
Start: 2020-09-23 | End: 2020-09-26

## 2020-09-23 RX ORDER — HEPARIN SODIUM 5000 [USP'U]/ML
5000 INJECTION INTRAVENOUS; SUBCUTANEOUS EVERY 12 HOURS
Refills: 0 | Status: DISCONTINUED | OUTPATIENT
Start: 2020-09-23 | End: 2020-09-26

## 2020-09-23 RX ORDER — ATORVASTATIN CALCIUM 80 MG/1
20 TABLET, FILM COATED ORAL AT BEDTIME
Refills: 0 | Status: DISCONTINUED | OUTPATIENT
Start: 2020-09-23 | End: 2020-09-23

## 2020-09-23 RX ORDER — INSULIN GLARGINE 100 [IU]/ML
26 INJECTION, SOLUTION SUBCUTANEOUS ONCE
Refills: 0 | Status: COMPLETED | OUTPATIENT
Start: 2020-09-23 | End: 2020-09-23

## 2020-09-23 RX ADMIN — INSULIN GLARGINE 26 UNIT(S): 100 INJECTION, SOLUTION SUBCUTANEOUS at 16:15

## 2020-09-23 RX ADMIN — SODIUM CHLORIDE 1000 MILLILITER(S): 9 INJECTION, SOLUTION INTRAVENOUS at 11:26

## 2020-09-23 RX ADMIN — SODIUM CHLORIDE 1000 MILLILITER(S): 9 INJECTION, SOLUTION INTRAVENOUS at 13:20

## 2020-09-23 RX ADMIN — CEFTRIAXONE 100 MILLIGRAM(S): 500 INJECTION, POWDER, FOR SOLUTION INTRAMUSCULAR; INTRAVENOUS at 16:05

## 2020-09-23 RX ADMIN — Medication 6: at 22:01

## 2020-09-23 RX ADMIN — Medication 8: at 18:27

## 2020-09-23 RX ADMIN — INSULIN GLARGINE 6 UNIT(S): 100 INJECTION, SOLUTION SUBCUTANEOUS at 13:21

## 2020-09-23 NOTE — CONSULT NOTE ADULT - ASSESSMENT
34 y/o M with no PMH presents to the ED after being told by his PCP that his glucoses are too high. Admitted for new onset diabetes.           34 y/o M with no PMH presents to the ED after being told by his PCP that his glucoses are too high. Admitted for new onset diabetes with DKA (high risk patient with DKA with high level decision-making).

## 2020-09-23 NOTE — CONSULT NOTE ADULT - ATTENDING COMMENTS
Agree with assessment and plan as above by Dr. Negrete. Reviewed all pertinent labs, glucose values, and imaging studies. Modifications made as indicated above. Start insulin regimen as indicated above. Monitor AG and bicarb for resolution of DKA. Plan to d/c on insulin. Trend TG levels. Needs dietary and lifestyle modifications in addition to glycemic control to help with TG levels. May need statin awaiting lipid profile. Check TFTs.    Sebastián Fernandes D.O  [phone]

## 2020-09-23 NOTE — H&P ADULT - HISTORY OF PRESENT ILLNESS
36 y/o male without sig pmh presenting with concern for elevated blood glucose. pt states has been feeling well. went to his doctor a few days ago and had blood work done. was called and told his blood glucose was elevated. pt denies any chest pain , sob or difficulty breathing. does endorse increased urinary frequency. no recent coughs or infections. +fh of DM. no rash. denies any joint pains.

## 2020-09-23 NOTE — ED ADULT NURSE REASSESSMENT NOTE - NS ED NURSE REASSESS COMMENT FT1
Patient resting in bed comfortably in no acute distress, alert and oriented x 3, updated on plan of care, labs collected. Pending further orders/results. Will continue to monitor. Silas Shah

## 2020-09-23 NOTE — CONSULT NOTE ADULT - PROBLEM SELECTOR RECOMMENDATION 9
DKA with AG 18 and BHOB 1.8 now improving after IVF and lantus. Appears to have new onset T2DM  -f/u JAVAN antibody, Islet cell antobody, insulin antibody, zinc transporter antibody   -lantus 32 units q5PM   -humalog moderate dose sliding scale q4h while NPO. May start carb consistent diet once glucose <250 and start humalog 11 units tidac with moderate sliding scale tidac and qhs.   -check bmp, vbg and bhob q4h until AG closes. -continue IVF as tolerated.   -if AG does not close by 10PM, consult MICU for insulin gtt  -nutrition consult  -provider to RN insulin pen teaching  discharge  Please send Lantus solsotar pen and humalog kwikpen as test script to check insurance coverage. Ok to send with current doses and update prior to d/c  If Lantus not covered- can try alternating with one of following   tresiba/basaglar/toujeo/Levemir  If Humalog not covered- can try alternating with one of following  novolog/apidra/admelog/fiasp   Prescription for insulin supplies glucometer, pen needle, test strips, lancets, alcohol pads.  [facility]  [address] [place] [address], [address], [place] [address] [phone]

## 2020-09-23 NOTE — H&P ADULT - NSHPLABSRESULTS_GEN_ALL_CORE
15.4   8.91  )-----------( 293      ( 23 Sep 2020 11:44 )             42.9           132<L>  |  90<L>  |  21  ----------------------------<  590<HH>  4.4   |  24  |  1.25    Ca    10.4      23 Sep 2020 11:44  Mg     2.1         TPro  8.8<H>  /  Alb  4.9  /  TBili  0.5  /  DBili  x   /  AST  56<H>  /  ALT  32  /  AlkPhos  88                Urinalysis Basic - ( 23 Sep 2020 11:38 )    Color: Colorless / Appearance: Clear / S.024 / pH: x  Gluc: x / Ketone: Small  / Bili: Negative / Urobili: Negative   Blood: x / Protein: Negative / Nitrite: Negative   Leuk Esterase: Large / RBC: 3 /hpf / WBC 16 /HPF   Sq Epi: x / Non Sq Epi: 1 /hpf / Bacteria: Negative      < from: Xray Chest 1 View AP/PA (20 @ 11:58) >    IMPRESSION:  No active pulmonary disease. Normal heart size.    < end of copied text >          POCT Blood Glucose.: 373 mg/dL (23 Sep 2020 15:45)

## 2020-09-23 NOTE — H&P ADULT - ASSESSMENT
35 m with     DKA/ New Diabetes Mellitus  - IVF  - BS control  - Endocrine evaluation called    UTI  - U C  - US kidney/ bladder  - Ceftriaxone    Morbid Obesity  - Nutrition education    DVT prophylaxis    Further action as per clinical course     Abhishek Gutierrez MD pager [phone]  35 m with     DKA/ New Diabetes Mellitus  - IVF  - BS control  - Endocrine evaluation called    HIV  - continue Rx    UTI  - U C  - US kidney/ bladder  - Ceftriaxone    Morbid Obesity  - Nutrition education    DVT prophylaxis    Further action as per clinical course     Abhishek uGtierrez MD pager [phone]

## 2020-09-23 NOTE — H&P ADULT - NSHPPHYSICALEXAM_GEN_ALL_CORE
PHYSICAL EXAMINATION:  Vital Signs Last 24 Hrs  T(C): 36.7 (23 Sep 2020 16:23), Max: 36.8 (23 Sep 2020 10:51)  T(F): 98.1 (23 Sep 2020 16:23), Max: 98.2 (23 Sep 2020 10:51)  HR: 75 (23 Sep 2020 16:23) (75 - 102)  BP: 122/81 (23 Sep 2020 16:23) (122/81 - 144/92)  BP(mean): --  RR: 20 (23 Sep 2020 16:23) (18 - 20)  SpO2: 99% (23 Sep 2020 16:23) (97% - 99%)  CAPILLARY BLOOD GLUCOSE      POCT Blood Glucose.: 373 mg/dL (23 Sep 2020 15:45)  POCT Blood Glucose.: 461 mg/dL (23 Sep 2020 13:06)  POCT Blood Glucose.: 571 mg/dL (23 Sep 2020 11:14)  POCT Blood Glucose.: 572 mg/dL (23 Sep 2020 10:56)  POCT Blood Glucose.: 571 mg/dL (23 Sep 2020 10:54)      GENERAL: NAD, obese  HEAD:  atraumatic, normocephalic  EYES: sclera anicteric  ENMT: mucous membranes moist  NECK: supple, No JVD  CHEST/LUNG: clear to auscultation bilaterally; no rales, rhonchi, or wheezing b/l  HEART: normal S1, S2  ABDOMEN: BS+, soft, ND, NT   EXTREMITIES:  pulses palpable; no clubbing, cyanosis, or edema b/l LEs  NEURO: awake, alert, interactive; moves all extremities  SKIN: no rashes or lesions

## 2020-09-23 NOTE — ED ADULT NURSE REASSESSMENT NOTE - NS ED NURSE REASSESS COMMENT FT1
Patient resting in bed comfortably, given dinner tray, vital signs stable, pending bed placement, patient updated on plan of care. Will continue to monitor. HUBERT Shah

## 2020-09-23 NOTE — ED ADULT NURSE NOTE - OBJECTIVE STATEMENT
34 yo M presents to ED A+OX3 from waiting room c/o hyperglycemia. Patient denies Hx of diabetes, states he went to his PCP for annual exam and was called and told to come to ED after he was found to have high blood sugar. Patient reports increased thirst and urinary frequency. Denies chest pain, SOB, abdominal pain, N/V.  Patient is well appearing, sitting up in stretcher in no acute distress. Breathing spontaneous and unlabored on RA. Speaking in full sentences without difficulty. Skin warm dry and of color appropriate for ethnicity. Comfort and safety measures in place.

## 2020-09-23 NOTE — CONSULT NOTE ADULT - PROBLEM SELECTOR RECOMMENDATION 3
at goal not on meds     Eric Negrete MD, Endocrine Fellow   Pager  from 9-5PM. After hours and on weekends please call [phone]

## 2020-09-23 NOTE — ED PROVIDER NOTE - PHYSICAL EXAMINATION
Attending Tea Bowers: Gen: NAD, heent: atrauamtic, eomi, perrla, mmm, op pink, uvula midline, neck; nttp, no nuchal rigidity, chest: nttp, no crepitus, cv: rrr, no murmurs, lungs: ctab, abd: soft, nontender, nondistended, no peritoneal signs, +BS, no guarding, ext: wwp, neg homans, skin: no rash, neuro: awake and alert, following commands, speech clear, sensation and strength intact, no focal deficits

## 2020-09-23 NOTE — ED ADULT NURSE REASSESSMENT NOTE - NS ED NURSE REASSESS COMMENT FT1
Patient resting in bed comfortably, cultures collected, iv abx started as ordered, pending bed placement. HUBERT Shah

## 2020-09-23 NOTE — ED ADULT NURSE NOTE - CHIEF COMPLAINT QUOTE
sent in by PCP for High BG, reportedly >1000; patient states Endocrinologist does not have appt until march and was told to come to ED to get medication

## 2020-09-23 NOTE — ED PROVIDER NOTE - OBJECTIVE STATEMENT
Attending Tea Bowers: 36 y/o male without sig pmh presenting with concern for elevated blood glucose. pt states has been feeling well. went to his doctor a few days ago and had blood work done. was called and told his blood glucose was elevated. pt denies any chest pain , sob or difficulty breathing. does endorse increased urinary frequency. no recent coughs or infections. +fh of DM. no rash. denies any joint pains.

## 2020-09-23 NOTE — ED ADULT TRIAGE NOTE - CHIEF COMPLAINT QUOTE
sent in by PCP for High BG, reportedly >1000; g patient reports Endocrinologist does not have appt until march and was told to come to ED to get medication sent in by PCP for High BG, reportedly >1000; patient states Endocrinologist does not have appt until march and was told to come to ED to get medication

## 2020-09-23 NOTE — H&P ADULT - NSICDXPASTMEDICALHX_GEN_ALL_CORE_FT
PAST MEDICAL HISTORY:  HTN (hypertension)      PAST MEDICAL HISTORY:  HIV disease     HTN (hypertension)

## 2020-09-23 NOTE — ED PROVIDER NOTE - CLINICAL SUMMARY MEDICAL DECISION MAKING FREE TEXT BOX
36yo M no sig PMHx p/w elevated finger stick at PMD office, reports increased thirst and urinary frequency, otherwise feels well. Will send labs, give fluids, likely CDU for endocrine 36yo M no sig PMHx p/w elevated finger stick at PMD office, reports increased thirst and urinary frequency, otherwise feels well. Will send labs, give fluids, likely CDU for endocrine  Attending Tea Bowers: 36 y/o male sent to the ED for elevated blood glucose. upon arrival pt well appearing. pt found to have sig hyperglycemia with small anion gap and elevated beta hydroxybutyrate. pt started on IVC and endocrine consulted. will admit for further monitoring. pt denies any fevers or chills. +FH of DM

## 2020-09-23 NOTE — H&P ADULT - NSHPSOCIALHISTORY_GEN_ALL_CORE
Social History:    Marital Status:  (   )    ( x  ) Single    (   )    (  )   Occupation:   Lives with: (  ) alone  (  ) children   ( x ) spouse   (  ) parents  (  ) other    Substance Use (street drugs): ( x ) never used  (  ) other:  Tobacco Usage:  ( x  ) never smoked   (   ) former smoker   (   ) current smoker  (     ) pack years  (        ) last cigarette date  Alcohol Usage: denies     (     ) Advanced Directives: (     ) None    (      ) DNR    (     ) DNI    (     ) Health Care Proxy:

## 2020-09-23 NOTE — ED PROVIDER NOTE - PROGRESS NOTE DETAILS
Attending Tea Bowers: UA with positive leukocystes. pt denies any urinary symptoms culture sent. d/w endocrinologist

## 2020-09-23 NOTE — ED PROVIDER NOTE - ATTENDING CONTRIBUTION TO CARE
Attending MD Tea Bowers:  I personally have seen and examined this patient.  Resident note reviewed and agree on plan of care and except where noted.  See HPI, PE, and MDM for details.

## 2020-09-24 ENCOUNTER — TRANSCRIPTION ENCOUNTER (OUTPATIENT)
Age: 35
End: 2020-09-24

## 2020-09-24 DIAGNOSIS — E78.1 PURE HYPERGLYCERIDEMIA: ICD-10-CM

## 2020-09-24 DIAGNOSIS — E11.9 TYPE 2 DIABETES MELLITUS WITHOUT COMPLICATIONS: ICD-10-CM

## 2020-09-24 DIAGNOSIS — I10 ESSENTIAL (PRIMARY) HYPERTENSION: ICD-10-CM

## 2020-09-24 LAB
ANION GAP SERPL CALC-SCNC: 13 MMOL/L — SIGNIFICANT CHANGE UP (ref 5–17)
B-OH-BUTYR SERPL-SCNC: 0.7 MMOL/L — HIGH
BUN SERPL-MCNC: 16 MG/DL — SIGNIFICANT CHANGE UP (ref 7–23)
CALCIUM SERPL-MCNC: 9.5 MG/DL — SIGNIFICANT CHANGE UP (ref 8.4–10.5)
CHLORIDE SERPL-SCNC: 95 MMOL/L — LOW (ref 96–108)
CHOLEST SERPL-MCNC: 148 MG/DL — SIGNIFICANT CHANGE UP (ref 10–199)
CO2 SERPL-SCNC: 26 MMOL/L — SIGNIFICANT CHANGE UP (ref 22–31)
CREAT SERPL-MCNC: 1.28 MG/DL — SIGNIFICANT CHANGE UP (ref 0.5–1.3)
CULTURE RESULTS: SIGNIFICANT CHANGE UP
GLUCOSE BLDC GLUCOMTR-MCNC: 246 MG/DL — HIGH (ref 70–99)
GLUCOSE BLDC GLUCOMTR-MCNC: 273 MG/DL — HIGH (ref 70–99)
GLUCOSE BLDC GLUCOMTR-MCNC: 277 MG/DL — HIGH (ref 70–99)
GLUCOSE BLDC GLUCOMTR-MCNC: 302 MG/DL — HIGH (ref 70–99)
GLUCOSE BLDC GLUCOMTR-MCNC: 341 MG/DL — HIGH (ref 70–99)
GLUCOSE BLDC GLUCOMTR-MCNC: 346 MG/DL — HIGH (ref 70–99)
GLUCOSE SERPL-MCNC: 287 MG/DL — HIGH (ref 70–99)
HCT VFR BLD CALC: 41.2 % — SIGNIFICANT CHANGE UP (ref 39–50)
HDLC SERPL-MCNC: 23 MG/DL — LOW
HGB BLD-MCNC: 14.2 G/DL — SIGNIFICANT CHANGE UP (ref 13–17)
LIPID PNL WITH DIRECT LDL SERPL: SIGNIFICANT CHANGE UP MG/DL
MCHC RBC-ENTMCNC: 28 PG — SIGNIFICANT CHANGE UP (ref 27–34)
MCHC RBC-ENTMCNC: 34.5 GM/DL — SIGNIFICANT CHANGE UP (ref 32–36)
MCV RBC AUTO: 81.1 FL — SIGNIFICANT CHANGE UP (ref 80–100)
NRBC # BLD: 0 /100 WBCS — SIGNIFICANT CHANGE UP (ref 0–0)
PLATELET # BLD AUTO: 257 K/UL — SIGNIFICANT CHANGE UP (ref 150–400)
POTASSIUM SERPL-MCNC: 3.7 MMOL/L — SIGNIFICANT CHANGE UP (ref 3.5–5.3)
POTASSIUM SERPL-SCNC: 3.7 MMOL/L — SIGNIFICANT CHANGE UP (ref 3.5–5.3)
RBC # BLD: 5.08 M/UL — SIGNIFICANT CHANGE UP (ref 4.2–5.8)
RBC # FLD: 12.7 % — SIGNIFICANT CHANGE UP (ref 10.3–14.5)
SODIUM SERPL-SCNC: 134 MMOL/L — LOW (ref 135–145)
SPECIMEN SOURCE: SIGNIFICANT CHANGE UP
TOTAL CHOLESTEROL/HDL RATIO MEASUREMENT: 6.5 RATIO — SIGNIFICANT CHANGE UP (ref 3.4–9.6)
TRIGL SERPL-MCNC: 653 MG/DL — HIGH (ref 10–149)
WBC # BLD: 6.88 K/UL — SIGNIFICANT CHANGE UP (ref 3.8–10.5)
WBC # FLD AUTO: 6.88 K/UL — SIGNIFICANT CHANGE UP (ref 3.8–10.5)

## 2020-09-24 PROCEDURE — 76770 US EXAM ABDO BACK WALL COMP: CPT | Mod: 26

## 2020-09-24 PROCEDURE — 99232 SBSQ HOSP IP/OBS MODERATE 35: CPT | Mod: GC

## 2020-09-24 RX ORDER — INSULIN LISPRO 100/ML
VIAL (ML) SUBCUTANEOUS AT BEDTIME
Refills: 0 | Status: DISCONTINUED | OUTPATIENT
Start: 2020-09-24 | End: 2020-09-26

## 2020-09-24 RX ORDER — INSULIN LISPRO 100/ML
VIAL (ML) SUBCUTANEOUS
Refills: 0 | Status: DISCONTINUED | OUTPATIENT
Start: 2020-09-24 | End: 2020-09-26

## 2020-09-24 RX ORDER — FENOFIBRATE,MICRONIZED 130 MG
145 CAPSULE ORAL DAILY
Refills: 0 | Status: DISCONTINUED | OUTPATIENT
Start: 2020-09-24 | End: 2020-09-26

## 2020-09-24 RX ORDER — INSULIN ASPART 100 [IU]/ML
11 INJECTION, SOLUTION SUBCUTANEOUS
Qty: 1 | Refills: 0
Start: 2020-09-24

## 2020-09-24 RX ORDER — INSULIN LISPRO 100/ML
11 VIAL (ML) SUBCUTANEOUS
Qty: 1 | Refills: 0
Start: 2020-09-24

## 2020-09-24 RX ORDER — INSULIN GLARGINE 100 [IU]/ML
1 INJECTION, SOLUTION SUBCUTANEOUS
Qty: 1 | Refills: 0
Start: 2020-09-24 | End: 2020-10-23

## 2020-09-24 RX ORDER — INSULIN GLARGINE 100 [IU]/ML
36 INJECTION, SOLUTION SUBCUTANEOUS
Qty: 1 | Refills: 0
Start: 2020-09-24

## 2020-09-24 RX ORDER — ATORVASTATIN CALCIUM 80 MG/1
40 TABLET, FILM COATED ORAL AT BEDTIME
Refills: 0 | Status: DISCONTINUED | OUTPATIENT
Start: 2020-09-24 | End: 2020-09-26

## 2020-09-24 RX ORDER — INSULIN LISPRO 100/ML
11 VIAL (ML) SUBCUTANEOUS
Refills: 0 | Status: DISCONTINUED | OUTPATIENT
Start: 2020-09-24 | End: 2020-09-25

## 2020-09-24 RX ORDER — ISOPROPYL ALCOHOL, BENZOCAINE .7; .06 ML/ML; ML/ML
1 SWAB TOPICAL
Qty: 100 | Refills: 1
Start: 2020-09-24 | End: 2020-11-12

## 2020-09-24 RX ADMIN — ATORVASTATIN CALCIUM 40 MILLIGRAM(S): 80 TABLET, FILM COATED ORAL at 21:05

## 2020-09-24 RX ADMIN — BICTEGRAVIR SODIUM, EMTRICITABINE, AND TENOFOVIR ALAFENAMIDE FUMARATE 1 TABLET(S): 30; 120; 15 TABLET ORAL at 12:41

## 2020-09-24 RX ADMIN — HEPARIN SODIUM 5000 UNIT(S): 5000 INJECTION INTRAVENOUS; SUBCUTANEOUS at 06:04

## 2020-09-24 RX ADMIN — Medication 11 UNIT(S): at 18:08

## 2020-09-24 RX ADMIN — Medication 6: at 09:53

## 2020-09-24 RX ADMIN — Medication 81 MILLIGRAM(S): at 12:40

## 2020-09-24 RX ADMIN — Medication 4: at 02:10

## 2020-09-24 RX ADMIN — Medication 4: at 21:06

## 2020-09-24 RX ADMIN — INSULIN GLARGINE 32 UNIT(S): 100 INJECTION, SOLUTION SUBCUTANEOUS at 17:59

## 2020-09-24 RX ADMIN — Medication 145 MILLIGRAM(S): at 21:02

## 2020-09-24 RX ADMIN — LOSARTAN POTASSIUM 100 MILLIGRAM(S): 100 TABLET, FILM COATED ORAL at 06:05

## 2020-09-24 RX ADMIN — Medication 6: at 06:04

## 2020-09-24 RX ADMIN — Medication 8: at 13:23

## 2020-09-24 RX ADMIN — HEPARIN SODIUM 5000 UNIT(S): 5000 INJECTION INTRAVENOUS; SUBCUTANEOUS at 17:59

## 2020-09-24 RX ADMIN — CEFTRIAXONE 100 MILLIGRAM(S): 500 INJECTION, POWDER, FOR SOLUTION INTRAMUSCULAR; INTRAVENOUS at 16:09

## 2020-09-24 RX ADMIN — Medication 8: at 18:08

## 2020-09-24 NOTE — DISCHARGE NOTE PROVIDER - NSDCCPCAREPLAN_GEN_ALL_CORE_FT
PRINCIPAL DISCHARGE DIAGNOSIS  Diagnosis: DM (diabetes mellitus) with ketoacidosis  Assessment and Plan of Treatment: HgA1C this admission was 11.4  Make sure you get your HgA1c checked every three months.  If you take oral diabetes medications, check your blood glucose two times a day.  If you take insulin, check your blood glucose before meals and at bedtime.  It's important not to skip any meals.  Keep a log of your blood glucose results and always take it with you to your doctor appointments.  Keep a list of your current medications including injectables and over the counter medications and bring this medication list with you to all your doctor appointments.  If you have not seen your ophthalmologist this year call for appointment.  Check your feet daily for redness, sores, or openings. Do not self treat. If no improvement in two days call your primary care physician for an appointment.  Low blood sugar (hypoglycemia) is a blood sugar below 70mg/dl. Check your blood sugar if you feel signs/symptoms of hypoglycemia. If your blood sugar is below 70 take 15 grams of carbohydrates (ex 4 oz of apple juice, 3-4 glucose tablets, or 4-6 oz of regular soda) wait 15 minutes and repeat blood sugar to make sure it comes up above 70.  If your blood sugar is above 70 and you are due for a meal, have a meal.  If you are not due for a meal have a snack.  This snack helps keeps your blood sugar at a safe range.  [address] [place] [address] [address], [place] [address] [phone].         SECONDARY DISCHARGE DIAGNOSES  Diagnosis: Infection, HIV  Assessment and Plan of Treatment: Continue current medications, Follow up ID for managment    Diagnosis: Essential hypertension  Assessment and Plan of Treatment: Follow up with your medical doctor to establish long term blood pressure treatment goals.       PRINCIPAL DISCHARGE DIAGNOSIS  Diagnosis: DM (diabetes mellitus) with ketoacidosis  Assessment and Plan of Treatment: HgA1C this admission was 11.4  Make sure you get your HgA1c checked every three months.  If you take oral diabetes medications, check your blood glucose two times a day.  If you take insulin, check your blood glucose before meals and at bedtime.  It's important not to skip any meals.  Keep a log of your blood glucose results and always take it with you to your doctor appointments.  Keep a list of your current medications including injectables and over the counter medications and bring this medication list with you to all your doctor appointments.  If you have not seen your ophthalmologist this year call for appointment.  Check your feet daily for redness, sores, or openings. Do not self treat. If no improvement in two days call your primary care physician for an appointment.  Low blood sugar (hypoglycemia) is a blood sugar below 70mg/dl. Check your blood sugar if you feel signs/symptoms of hypoglycemia. If your blood sugar is below 70 take 15 grams of carbohydrates (ex 4 oz of apple juice, 3-4 glucose tablets, or 4-6 oz of regular soda) wait 15 minutes and repeat blood sugar to make sure it comes up above 70.  If your blood sugar is above 70 and you are due for a meal, have a meal.  If you are not due for a meal have a snack.  This snack helps keeps your blood sugar at a safe range.  CALL TO MAKE APPOINTMENT FOR NEXT WEEK****  [address] [facility] [address], [place] [address]   [phone].         SECONDARY DISCHARGE DIAGNOSES  Diagnosis: Infection, HIV  Assessment and Plan of Treatment: Continue current medications, Follow up Dr. Kimmy TAPIA for managment    Diagnosis: Essential hypertension  Assessment and Plan of Treatment: Follow up with your medical doctor to establish long term blood pressure treatment goals.       PRINCIPAL DISCHARGE DIAGNOSIS  Diagnosis: DM (diabetes mellitus) with ketoacidosis  Assessment and Plan of Treatment: HgA1C this admission was 11.4  Make sure you get your HgA1c checked every three months.  If you take oral diabetes medications, check your blood glucose two times a day.  If you take insulin, check your blood glucose before meals and at bedtime.  It's important not to skip any meals.  Keep a log of your blood glucose results and always take it with you to your doctor appointments.  Keep a list of your current medications including injectables and over the counter medications and bring this medication list with you to all your doctor appointments.  If you have not seen your ophthalmologist this year call for appointment.  Check your feet daily for redness, sores, or openings. Do not self treat. If no improvement in two days call your primary care physician for an appointment.  Low blood sugar (hypoglycemia) is a blood sugar below 70mg/dl. Check your blood sugar if you feel signs/symptoms of hypoglycemia. If your blood sugar is below 70 take 15 grams of carbohydrates (ex 4 oz of apple juice, 3-4 glucose tablets, or 4-6 oz of regular soda) wait 15 minutes and repeat blood sugar to make sure it comes up above 70.  If your blood sugar is above 70 and you are due for a meal, have a meal.  If you are not due for a meal have a snack.  This snack helps keeps your blood sugar at a safe range.  CALL TO MAKE APPOINTMENT FOR NEXT WEEK****  [address] [facility] [address], [place] [address]   [phone].         SECONDARY DISCHARGE DIAGNOSES  Diagnosis: Acute UTI  Assessment and Plan of Treatment: HOME CARE INSTRUCTIONS  f you were prescribed antibiotics, take them exactly as your caregiver instructs you. Finish the medication even if you feel better after you have only taken some of the medication.  Drink enough water and fluids to keep your urine clear or pale yellow.  Avoid caffeine, tea, and carbonated beverages. They tend to irritate your bladder.  Empty your bladder often. Avoid holding urine for long periods of time.  Empty your bladder before and after sexual intercourse.  After a bowel movement, women should cleanse from front to back. Use each tissue only once.  SEEK MEDICAL CARE IF:  You have back pain.  You develop a fever.  Your symptoms do not begin to resolve within 3 days.  SEEK IMMEDIATE MEDICAL CARE IF:  You have severe back pain or lower abdominal pain.  You develop chills.  You have nausea or vomiting.  You have continued burning or discomfort with urination.  Continue antibioitics for 4 more days.       Diagnosis: Infection, HIV  Assessment and Plan of Treatment: Continue current medications, Follow up IDDr. Oneal for managment    Diagnosis: Essential hypertension  Assessment and Plan of Treatment: Follow up with your medical doctor to establish long term blood pressure treatment goals.       PRINCIPAL DISCHARGE DIAGNOSIS  Diagnosis: DM (diabetes mellitus) with ketoacidosis  Assessment and Plan of Treatment: HgA1C this admission was 11.4  Make sure you get your HgA1c checked every three months.  If you take oral diabetes medications, check your blood glucose two times a day.  If you take insulin, check your blood glucose before meals and at bedtime.  It's important not to skip any meals.  Keep a log of your blood glucose results and always take it with you to your doctor appointments.  Keep a list of your current medications including injectables and over the counter medications and bring this medication list with you to all your doctor appointments.  If you have not seen your ophthalmologist this year call for appointment.  Check your feet daily for redness, sores, or openings. Do not self treat. If no improvement in two days call your primary care physician for an appointment.  Low blood sugar (hypoglycemia) is a blood sugar below 70mg/dl. Check your blood sugar if you feel signs/symptoms of hypoglycemia. If your blood sugar is below 70 take 15 grams of carbohydrates (ex 4 oz of apple juice, 3-4 glucose tablets, or 4-6 oz of regular soda) wait 15 minutes and repeat blood sugar to make sure it comes up above 70.  If your blood sugar is above 70 and you are due for a meal, have a meal.  If you are not due for a meal have a snack.  This snack helps keeps your blood sugar at a safe range.  CALL TO MAKE APPOINTMENT FOR NEXT WEEK**** Tell them you have been seen in the hospital. Bring a journal of you blood sugars   [address] [facility] [address], [place] [address] [phone].         SECONDARY DISCHARGE DIAGNOSES  Diagnosis: Acute UTI  Assessment and Plan of Treatment: HOME CARE INSTRUCTIONS  f you were prescribed antibiotics, take them exactly as your caregiver instructs you. Finish the medication even if you feel better after you have only taken some of the medication.  Drink enough water and fluids to keep your urine clear or pale yellow.  Avoid caffeine, tea, and carbonated beverages. They tend to irritate your bladder.  Empty your bladder often. Avoid holding urine for long periods of time.  Empty your bladder before and after sexual intercourse.  After a bowel movement, women should cleanse from front to back. Use each tissue only once.  SEEK MEDICAL CARE IF:  You have back pain.  You develop a fever.  Your symptoms do not begin to resolve within 3 days.  SEEK IMMEDIATE MEDICAL CARE IF:  You have severe back pain or lower abdominal pain.  You develop chills.  You have nausea or vomiting.  You have continued burning or discomfort with urination.  Continue antibioitics for 4 more days.       Diagnosis: Infection, HIV  Assessment and Plan of Treatment: Continue current medications, Follow up ID, Dr. Oneal for managment    Diagnosis: Essential hypertension  Assessment and Plan of Treatment: Follow up with your medical doctor to establish long term blood pressure treatment goals.

## 2020-09-24 NOTE — PROGRESS NOTE ADULT - PROBLEM SELECTOR PLAN 1
- DKA has now resolved and on consistent carb diet  - Continue Lantus 32 units q5PM   - Start Humalog 11 units tid ac   - Continue moderate Humalog correctional scale tidac and qhs.   - Monitor FS ac and hs  - FS goal 100-180  - Nutrition consult  - RN to provide insulin pen teaching  - f/u JAVAN antibody, Islet cell antobody, insulin antibody, zinc transporter antibody   Discharge plan:   Please send Lantus solsotar pen and humalog kwikpen as test script to check insurance coverage. Ok to send with current doses and update prior to d/c  If Lantus not covered- can try alternating with one of following   tresiba/basaglar/toujeo/Levemir  If Humalog not covered- can try alternating with one of following  novolog/apidra/admelog/fiasp   Prescription for insulin supplies glucometer, pen needle, test strips, lancets, alcohol pads.  [facility]  [address] [place] [address], [address], [place] [address] [phone]. - DKA has now resolved and on consistent carb diet  - Continue Lantus 32 units q5PM   - Start Humalog 11 units tid ac   - Continue moderate Humalog correctional scale tidac and qhs.   - Monitor FS ac and hs  - FS goal 100-180  - Counseled about diet, weight loss and exercise.   - Nutrition consult  - RN to provide insulin pen teaching  - f/u JAVAN antibody, Islet cell antobody, insulin antibody, zinc transporter antibody   Discharge plan:   Please send Lantus solsotar pen and humalog kwikpen as test script to check insurance coverage. Ok to send with current doses and update prior to d/c  If Lantus not covered- can try alternating with one of following   tresiba/basaglar/toujeo/Levemir  If Humalog not covered- can try alternating with one of following  novolog/apidra/admelog/fiasp   Prescription for insulin supplies glucometer, pen needle, test strips, lancets, alcohol pads.  [facility]  [address] [place] [address], [address], [place] [address] [phone]. - DKA has now resolved and on consistent carb diet  - Continue Lantus 32 units q5PM (will likely increase to 40 units tomorrow for 5pm, but will wait to see fasting glucose first).  - Start Humalog 11 units tid ac   - Continue moderate Humalog correctional scale tidac and qhs.   - Monitor FS ac and hs  - FS goal 100-180  - Counseled about diet, weight loss and exercise.   - Nutrition consult  - RN to provide insulin pen teaching  - f/u JAVAN antibody, Islet cell antobody, insulin antibody, zinc transporter antibody   Discharge plan:   Please send Lantus solsotar pen and humalog kwikpen as test script to check insurance coverage. Ok to send with current doses and update prior to d/c  If Lantus not covered- can try alternating with one of following   tresiba/basaglar/toujeo/Levemir  If Humalog not covered- can try alternating with one of following  novolog/apidra/admelog/fiasp   Prescription for insulin supplies glucometer, pen needle, test strips, lancets, alcohol pads.  [facility]  [address] [place] [address], [address], [place] [address] [phone].

## 2020-09-24 NOTE — DISCHARGE NOTE PROVIDER - CARE PROVIDER_API CALL
Elly Oneal  INFECTIOUS DISEASE  [address] [place] [address]  Phone: [phone]  Fax: [phone]  Follow Up Time:     Sebastián Fernandes  ENDOCRINOLOGY/METAB/DIABETES  [address] [place] [address]  Phone: [phone]  Fax: [phone]  Follow Up Time:

## 2020-09-24 NOTE — PROGRESS NOTE ADULT - ASSESSMENT
35 m with     DKA/ New Diabetes Mellitus   - IVF  - BS control  - Endocrine follow    HIV  - continue Rx    UTI  - U C pending   - Ceftriaxone    Morbid Obesity  - Nutrition education    Hyperlipidemia  - start Statin and Tricor  - nutrition education     DVT prophylaxis    Abhishek Gutierrez MD pager [phone]

## 2020-09-24 NOTE — PROGRESS NOTE ADULT - PROBLEM SELECTOR PLAN 3
- Elevated triglycerides level to 1153-->653 now, along with low HDL and not calculated LDL  - Recommend to start statin + fibrates. - Elevated triglycerides level to 1153-->653 now, along with low HDL and not calculated LDL. Likely related to uncontrolled DM.  - Would advise dietary changes for now and repeat lipid profile in 3 months as outpatient. - Elevated triglycerides level to 1153-->653 now, along with low HDL and not calculated LDL. Likely related to uncontrolled DM.  - Would advise dietary changes for now and repeat lipid profile in 3 months as outpatient. Improved glycemic control alone and insulin treatment can dramatically decrease TG levels.

## 2020-09-24 NOTE — DISCHARGE NOTE PROVIDER - HOSPITAL COURSE
36yo M w/ PMH of HIV, HTN, MO p/w elevated finger stick at PMD office, reports increased thirst and urinary frequency       Problem/Plan - 1:  ·  Problem: Diabetes mellitus, new onset.  Plan: - DKA has now resolved and on consistent carb diet  - Increase Lantus 40 units q6PM  - Humalog change to 18 units tid ac   - Continue moderate Humalog correctional scale tidac and qhs.   - Monitor FS ac and hs  - FS goal 100-180  - Counseled about diet, weight loss and exercise.   - Nutrition consult  - RN to provide insulin pen teaching  - f/u JAVAN antibody, Islet cell antobody, insulin antibody, zinc transporter antibody   Discharge plan:   Please send Lantus solsotar pen and humalog kwikpen as test script to check insurance coverage. Ok to send with current doses and update prior to d/c  If Lantus not covered- can try alternating with one of following   tresiba/basaglar/toujeo/Levemir  If Humalog not covered- can try alternating with one of following  novolog/apidra/admelog/fiasp   Prescription for insulin supplies glucometer, pen needle, test strips, lancets, alcohol pads.  [facility]  [address] [place] [address], [address], [place] [address] [phone].      Problem/Plan - 2:  ·  Problem: Diabetic ketoacidosis without coma associated with type 2 diabetes mellitus.  Plan: - Now resolved, plan as above.      Problem/Plan - 3:  ·  Problem: Hypertriglyceridemia.  Plan: - Elevated triglycerides level to 1153-->653 now, along with low HDL and not calculated LDL. Likely related to uncontrolled DM.  - Would advise dietary changes for now and repeat lipid profile in 3 months as outpatient.  -statin and fibrate started by primary team. Repeat lipid panel with direct LDL outpatient. Goal LDL <100. Should be able to d/c fenofibrate outpatient as triglycerides would improve with better glycemic control.      Problem/Plan - 4:  ·  Problem: Essential hypertension.  Plan: - BP at goal <130/80, on ARBs.     Plan 5 UTI: Unclear if initial urinary frequency was by glucosuria & DKA & he was developing a true UTI or if a UTI led him to develop DKA.  Would complete a 7 days empiric course for suspected  UTI  Continue empiric Ceftriaxone in house  Can change antibiotics to PO ceftin 500 mg BID upon d/c home  Thank you        34yo M w/ PMH of HIV, HTN, MO p/w elevated finger stick at PMD office, reports increased thirst and urinary frequency       Problem/Plan - 1:  ·  Problem: Diabetes mellitus, new onset.  Plan: - DKA has now resolved and on consistent carb diet  - Increase Lantus 40 units q6PM  - Humalog change to 18 units tid ac   - Continue moderate Humalog correctional scale tidac and qhs.   - Monitor FS ac and hs  - FS goal 100-180  - Counseled about diet, weight loss and exercise.   - Nutrition consult  - RN to provide insulin pen teaching  - f/u JAVAN antibody, Islet cell antobody, insulin antibody, zinc transporter antibody   Discharge plan:   Please send Lantus solsotar pen and humalog kwikpen as test script to check insurance coverage. Ok to send with current doses and update prior to d/c  If Lantus not covered- can try alternating with one of following   tresiba/basaglar/toujeo/Levemir  If Humalog not covered- can try alternating with one of following  novolog/apidra/admelog/fiasp   Prescription for insulin supplies glucometer, pen needle, test strips, lancets, alcohol pads.  [facility]  [address] [place] [address], [address], [place] [address] [phone].     PT declined home care      Problem/Plan - 2:  ·  Problem: Diabetic ketoacidosis without coma associated with type 2 diabetes mellitus.  Plan: - Now resolved, plan as above.      Problem/Plan - 3:  ·  Problem: Hypertriglyceridemia.  Plan: - Elevated triglycerides level to 1153-->653 now, along with low HDL and not calculated LDL. Likely related to uncontrolled DM.  - Would advise dietary changes for now and repeat lipid profile in 3 months as outpatient.  -statin and fibrate started by primary team. Repeat lipid panel with direct LDL outpatient. Goal LDL <100. Should be able to d/c fenofibrate outpatient as triglycerides would improve with better glycemic control.      Problem/Plan - 4:  ·  Problem: Essential hypertension.  Plan: - BP at goal <130/80, on ARBs.     Plan 5 UTI: Unclear if initial urinary frequency was by glucosuria & DKA & he was developing a true UTI or if a UTI led him to develop DKA.  Would complete a 7 days empiric course for suspected  UTI  Continue empiric Ceftriaxone in house  Can change antibiotics to PO ceftin 500 mg BID upon d/c home  Thank you        36yo M w/ PMH of HIV, HTN, MO p/w elevated finger stick at PMD office, reports increased thirst and urinary frequency       Problem/Plan - 1:  ·  Problem: Diabetes mellitus, new onset.  Plan: - DKA has now resolved and on consistent carb diet  - Increase Lantus 40 units q6PM  - Humalog change to 18 units tid ac   - Continue moderate Humalog correctional scale tidac and qhs.   - Monitor FS ac and hs  - FS goal 100-180  - Counseled about diet, weight loss and exercise.   - Nutrition consult  - RN to provide insulin pen teaching  - f/u JAVAN antibody, Islet cell antobody, insulin antibody, zinc transporter antibody   Discharge plan:   Pt will continue lantus 40 units and premeal 18 units at home. Follow up with   [facility]  [address] [place] [address], [address], [place] [address] [phone].     PT declined home care      Problem/Plan - 2:  ·  Problem: Diabetic ketoacidosis without coma associated with type 2 diabetes mellitus.  Plan: - Now resolved, plan as above.      Problem/Plan - 3:  ·  Problem: Hypertriglyceridemia.  Plan: - Elevated triglycerides level to 1153-->653 now, along with low HDL and not calculated LDL. Likely related to uncontrolled DM.  - Would advise dietary changes for now and repeat lipid profile in 3 months as outpatient.  -statin and fibrate started by primary team. Repeat lipid panel with direct LDL outpatient. Goal LDL <100. Should be able to d/c fenofibrate outpatient as triglycerides would improve with better glycemic control.      Problem/Plan - 4:  ·  Problem: Essential hypertension.  Plan: - BP at goal <130/80, on ARBs.     Plan 5 UTI: Unclear if initial urinary frequency was by glucosuria & DKA & he was developing a true UTI or if a UTI led him to develop DKA.  Would complete a 7 days empiric course for suspected  UTI  Continue empiric Ceftriaxone in house  Can change antibiotics to PO ceftin 500 mg BID upon d/c home  Thank you

## 2020-09-24 NOTE — DISCHARGE NOTE PROVIDER - CARE PROVIDERS DIRECT ADDRESSES
[email or web address]

## 2020-09-24 NOTE — PROGRESS NOTE ADULT - ASSESSMENT
36 y/o M with no PMH presents to the ED after being told by his PCP that his glucoses are too high. Admitted for new onset diabetes with DKA (high risk patient with DKA with high level decision-making).  36 y/o M with no PMH presents to the ED after being told by his PCP that his glucoses are too high. Admitted for new onset diabetes with DKA

## 2020-09-24 NOTE — DISCHARGE NOTE PROVIDER - NSDCMRMEDTOKEN_GEN_ALL_CORE_FT
alcohol swabs : Apply topically to affected area 4 times a day   aspirin 81 mg oral delayed release tablet: 1 tab(s) orally once a day  Biktarvy oral tablet: 1 tab(s) orally once a day  glucometer (per patient&#x27;s insurance): Test blood sugars four times a day. Dispense #1 glucometer.  HumaLOG KwikPen 100 units/mL injectable solution: 11 unit(s) injectable 3 times a day (with meals)   Insulin Pen Needles, 4mm: 1 application subcutaneously 4 times a day. ** Use with insulin pen **   lancets: 1 application subcutaneously 4 times a day   Lantus Solostar Pen 100 units/mL subcutaneous solution: 36 unit(s) subcutaneous once a day (at bedtime)   olmesartan-hydrochlorothiazide 40 mg-12.5 mg oral tablet: 1 tab(s) orally once a day  test strips (per patient&#x27;s insurance): 1 application subcutaneously 4 times a day. ** Compatible with patient&#x27;s glucometer **  Vitamin D2 50,000 intl units (1.25 mg) oral capsule: 1 cap(s) orally once a week   alcohol swabs : Apply topically to affected area 4 times a day   aspirin 81 mg oral delayed release tablet: 1 tab(s) orally once a day  atorvastatin 40 mg oral tablet: 1 tab(s) orally once a day (at bedtime)  Biktarvy oral tablet: 1 tab(s) orally once a day  fenofibrate 145 mg oral tablet: 1 tab(s) orally once a day  glucometer (per patient&#x27;s insurance): Test blood sugars four times a day. Dispense #1 glucometer.  HumaLOG KwikPen 100 units/mL injectable solution: 18 unit(s) injectable 3 times a day (after meals)   Insulin Pen Needles, 4mm: 1 application subcutaneously 4 times a day. ** Use with insulin pen **   lancets: 1 application subcutaneously 4 times a day   Lantus Solostar Pen 100 units/mL subcutaneous solution: 40 unit(s) subcutaneous once a day (at bedtime)   NovoLOG FlexPen 100 units/mL injectable solution: 11 unit(s) injectable 3 times a day (with meals)   olmesartan-hydrochlorothiazide 40 mg-12.5 mg oral tablet: 1 tab(s) orally once a day  test strips (per patient&#x27;s insurance): 1 application subcutaneously 4 times a day. ** Compatible with patient&#x27;s glucometer **  Vitamin D2 50,000 intl units (1.25 mg) oral capsule: 1 cap(s) orally once a week   alcohol swabs : Apply topically to affected area 4 times a day   aspirin 81 mg oral delayed release tablet: 1 tab(s) orally once a day  atorvastatin 40 mg oral tablet: 1 tab(s) orally once a day (at bedtime)  Biktarvy oral tablet: 1 tab(s) orally once a day  cefuroxime 500 mg oral tablet: 1 tab(s) orally every 12 hours   fenofibrate 145 mg oral tablet: 1 tab(s) orally once a day  glucometer (per patient&#x27;s insurance): Test blood sugars four times a day. Dispense #1 glucometer.  HumaLOG KwikPen 100 units/mL injectable solution: 18 unit(s) injectable 3 times a day (after meals)   Insulin Pen Needles, 4mm: 1 application subcutaneously 4 times a day. ** Use with insulin pen **   lancets: 1 application subcutaneously 4 times a day   Lantus Solostar Pen 100 units/mL subcutaneous solution: 40 unit(s) subcutaneous once a day (at bedtime)   olmesartan-hydrochlorothiazide 40 mg-12.5 mg oral tablet: 1 tab(s) orally once a day  test strips (per patient&#x27;s insurance): 1 application subcutaneously 4 times a day. ** Compatible with patient&#x27;s glucometer **  Vitamin D2 50,000 intl units (1.25 mg) oral capsule: 1 cap(s) orally once a week

## 2020-09-25 LAB
ANION GAP SERPL CALC-SCNC: 12 MMOL/L — SIGNIFICANT CHANGE UP (ref 5–17)
BUN SERPL-MCNC: 16 MG/DL — SIGNIFICANT CHANGE UP (ref 7–23)
CALCIUM SERPL-MCNC: 9.2 MG/DL — SIGNIFICANT CHANGE UP (ref 8.4–10.5)
CHLORIDE SERPL-SCNC: 98 MMOL/L — SIGNIFICANT CHANGE UP (ref 96–108)
CO2 SERPL-SCNC: 23 MMOL/L — SIGNIFICANT CHANGE UP (ref 22–31)
CREAT SERPL-MCNC: 1.3 MG/DL — SIGNIFICANT CHANGE UP (ref 0.5–1.3)
GLUCOSE BLDC GLUCOMTR-MCNC: 122 MG/DL — HIGH (ref 70–99)
GLUCOSE BLDC GLUCOMTR-MCNC: 214 MG/DL — HIGH (ref 70–99)
GLUCOSE BLDC GLUCOMTR-MCNC: 221 MG/DL — HIGH (ref 70–99)
GLUCOSE BLDC GLUCOMTR-MCNC: 276 MG/DL — HIGH (ref 70–99)
GLUCOSE BLDC GLUCOMTR-MCNC: 298 MG/DL — HIGH (ref 70–99)
GLUCOSE SERPL-MCNC: 298 MG/DL — HIGH (ref 70–99)
POTASSIUM SERPL-MCNC: 3.9 MMOL/L — SIGNIFICANT CHANGE UP (ref 3.5–5.3)
POTASSIUM SERPL-SCNC: 3.9 MMOL/L — SIGNIFICANT CHANGE UP (ref 3.5–5.3)
SARS-COV-2 IGG SERPL QL IA: NEGATIVE — SIGNIFICANT CHANGE UP
SARS-COV-2 IGM SERPL IA-ACNC: <3.8 AU/ML — SIGNIFICANT CHANGE UP
SODIUM SERPL-SCNC: 133 MMOL/L — LOW (ref 135–145)

## 2020-09-25 PROCEDURE — 99232 SBSQ HOSP IP/OBS MODERATE 35: CPT | Mod: GC

## 2020-09-25 RX ORDER — INSULIN GLARGINE 100 [IU]/ML
40 INJECTION, SOLUTION SUBCUTANEOUS
Qty: 1 | Refills: 0
Start: 2020-09-25 | End: 2020-10-24

## 2020-09-25 RX ORDER — INSULIN GLARGINE 100 [IU]/ML
36 INJECTION, SOLUTION SUBCUTANEOUS
Qty: 1 | Refills: 0
Start: 2020-09-25

## 2020-09-25 RX ORDER — INSULIN LISPRO 100/ML
18 VIAL (ML) SUBCUTANEOUS
Qty: 1 | Refills: 0
Start: 2020-09-25 | End: 2020-10-24

## 2020-09-25 RX ORDER — INSULIN LISPRO 100/ML
18 VIAL (ML) SUBCUTANEOUS
Refills: 0 | Status: DISCONTINUED | OUTPATIENT
Start: 2020-09-25 | End: 2020-09-26

## 2020-09-25 RX ORDER — INSULIN GLARGINE 100 [IU]/ML
40 INJECTION, SOLUTION SUBCUTANEOUS
Refills: 0 | Status: DISCONTINUED | OUTPATIENT
Start: 2020-09-25 | End: 2020-09-26

## 2020-09-25 RX ORDER — ISOPROPYL ALCOHOL, BENZOCAINE .7; .06 ML/ML; ML/ML
1 SWAB TOPICAL
Qty: 100 | Refills: 1
Start: 2020-09-25 | End: 2020-11-13

## 2020-09-25 RX ADMIN — SODIUM CHLORIDE 75 MILLILITER(S): 9 INJECTION INTRAMUSCULAR; INTRAVENOUS; SUBCUTANEOUS at 19:00

## 2020-09-25 RX ADMIN — INSULIN GLARGINE 40 UNIT(S): 100 INJECTION, SOLUTION SUBCUTANEOUS at 18:38

## 2020-09-25 RX ADMIN — HEPARIN SODIUM 5000 UNIT(S): 5000 INJECTION INTRAVENOUS; SUBCUTANEOUS at 17:30

## 2020-09-25 RX ADMIN — Medication 4: at 17:29

## 2020-09-25 RX ADMIN — Medication 6: at 13:04

## 2020-09-25 RX ADMIN — Medication 18 UNIT(S): at 13:06

## 2020-09-25 RX ADMIN — Medication 11 UNIT(S): at 09:15

## 2020-09-25 RX ADMIN — Medication 6: at 09:16

## 2020-09-25 RX ADMIN — Medication 81 MILLIGRAM(S): at 12:06

## 2020-09-25 RX ADMIN — HEPARIN SODIUM 5000 UNIT(S): 5000 INJECTION INTRAVENOUS; SUBCUTANEOUS at 05:22

## 2020-09-25 RX ADMIN — Medication 18 UNIT(S): at 17:30

## 2020-09-25 RX ADMIN — BICTEGRAVIR SODIUM, EMTRICITABINE, AND TENOFOVIR ALAFENAMIDE FUMARATE 1 TABLET(S): 30; 120; 15 TABLET ORAL at 12:06

## 2020-09-25 RX ADMIN — LOSARTAN POTASSIUM 100 MILLIGRAM(S): 100 TABLET, FILM COATED ORAL at 05:25

## 2020-09-25 RX ADMIN — ATORVASTATIN CALCIUM 40 MILLIGRAM(S): 80 TABLET, FILM COATED ORAL at 21:03

## 2020-09-25 RX ADMIN — CEFTRIAXONE 100 MILLIGRAM(S): 500 INJECTION, POWDER, FOR SOLUTION INTRAMUSCULAR; INTRAVENOUS at 15:36

## 2020-09-25 RX ADMIN — Medication 145 MILLIGRAM(S): at 12:49

## 2020-09-25 NOTE — PROGRESS NOTE ADULT - ASSESSMENT
36 y/o M with no PMH presents to the ED after being told by his PCP that his glucoses are too high. Admitted for new onset diabetes with DKA

## 2020-09-25 NOTE — DIETITIAN INITIAL EVALUATION ADULT. - OTHER INFO
PTA pt reports good PO intake and appetite. Pt is a , primarily eats convenience foods at rest stops. Diet recall high in carbohydrates and processed foods. Pt denies any recent changes in weight, states he weighs ~300 pounds. Noted dosing weight in chart of 307.1 pounds. Pt denies micronutrient supplementation PTA. NKFA. No chewing/swallowing issues.    Pt with new onset DM, HbA1c 11.4%. Per endocrinology pt to be discharged on insulin regimen with FreeStyle Javan glucometer.    Pt denies any N+V, last BM 9/24. Pt reports fair to good PO intake. Provided education on T2DM nutrition therapy. Provided education on foods containing carbohydrates, foods containing proteins, and portion sizes. Stressed the importance of a balanced meal to maintain blood glucose. Encouraged vegetables consumption. Described HbA1c and stressed importance of its normal levels. Encouraged Pt to continue monitoring blood glucose at home. Discussed how to manage hypoglycemia. Recommended water consumption with avoidance of soda and juice. Literature provided, pt expressed understanding.

## 2020-09-25 NOTE — PROGRESS NOTE ADULT - ATTENDING COMMENTS
Agree with assessment and plan as above by Dr. Negrete. Reviewed all pertinent labs, glucose values, and imaging studies. Modifications made as indicated above. Increase basal bolus dosing as indicated above based on recent insulin requirements. New Javan placed. Plan to d/c on insulin. Needs education on injections prior to d/c.    Sebastián Fernandes D.O  [phone]
Agree with assessment and plan as above by Dr. Samaniego. Reviewed all pertinent labs, glucose values, and imaging studies. Modifications made as indicated above. Increase Humalog to 11 units with meals. Monitor fasting glucose tomorrow. If still above goal would increase to 40 units of Lantus for tomorrow at 5pm. Plan to d/c on insulin. Needs education.    Sebastián Fernandes D.O  [phone]

## 2020-09-25 NOTE — PROGRESS NOTE ADULT - PROBLEM SELECTOR PLAN 1
- DKA has now resolved and on consistent carb diet  - Increase Lantus 40 units q6PM  - Humalog change to 18 units tid ac   - Continue moderate Humalog correctional scale tidac and qhs.   - Monitor FS ac and hs  - FS goal 100-180  - Counseled about diet, weight loss and exercise.   - Nutrition consult  - RN to provide insulin pen teaching  - f/u JAVAN antibody, Islet cell antobody, insulin antibody, zinc transporter antibody   Discharge plan:   Please send Lantus solsotar pen and humalog kwikpen as test script to check insurance coverage. Ok to send with current doses and update prior to d/c  If Lantus not covered- can try alternating with one of following   tresiba/basaglar/toujeo/Levemir  If Humalog not covered- can try alternating with one of following  novolog/apidra/admelog/fiasp   Prescription for insulin supplies glucometer, pen needle, test strips, lancets, alcohol pads.  [facility]  [address] [place] [address], [address], [place] [address] [phone].

## 2020-09-25 NOTE — DIETITIAN INITIAL EVALUATION ADULT. - PERTINENT LABORATORY DATA
09-25 Na 133 mmol/L<L> Glu 298 mg/dL<H> K+ 3.9 mmol/L Cr  1.30 mg/dL BUN 16 mg/dL HbA1c 11.4%  POCT Blood Glucose.: 298 mg/dL (25 Sep 2020 09:07)  POCT Blood Glucose.: 341 mg/dL (24 Sep 2020 21:05)  POCT Blood Glucose.: 346 mg/dL (24 Sep 2020 17:56)  POCT Blood Glucose.: 302 mg/dL (24 Sep 2020 13:18)

## 2020-09-25 NOTE — PROGRESS NOTE ADULT - PROBLEM SELECTOR PLAN 3
- Elevated triglycerides level to 1153-->653 now, along with low HDL and not calculated LDL. Likely related to uncontrolled DM.  - Would advise dietary changes for now and repeat lipid profile in 3 months as outpatient.  -statin and fibrate started by primary team. Repeat lipid panel with direct LDL outpatient. Goal LDL <100. Should be able to d/c fenofibrate outpatient as triglycerides would improve with better glycemic control.

## 2020-09-25 NOTE — PROGRESS NOTE ADULT - ASSESSMENT
35 m with     DKA/ New Diabetes Mellitus   - IVF  - BS control  - Endocrine follow    HIV  - continue Rx    UTI  - U C noted   - Ceftriaxone  - ID evaluation     Morbid Obesity  - Nutrition education    Hyperlipidemia  - start Statin and Tricor  - nutrition education     DVT prophylaxis    Abhishek Gutierrez MD pager [phone]

## 2020-09-25 NOTE — DIETITIAN INITIAL EVALUATION ADULT. - REASON INDICATOR FOR ASSESSMENT
Consult received for registered dietitian.    Per chart, pt is a 35 y.o with new onset-DM p/w DKA. Consult received for registered dietitian.    Per chart, pt is a 35 y.o M with new onset-DM p/w DKA.

## 2020-09-25 NOTE — DIETITIAN INITIAL EVALUATION ADULT. - PHYSICAL APPEARANCE
other (specify) no noted pressure injuries or edema as per documentation.   Ht: 69 inches Wt: 307.1 pounds BMI: 45.3 kg/m2 IBW: 160 pounds (+/-10%) %IBW: 192%

## 2020-09-25 NOTE — PROGRESS NOTE ADULT - PROBLEM SELECTOR PROBLEM 2
Diabetic ketoacidosis without coma associated with type 2 diabetes mellitus
Diabetic ketoacidosis without coma associated with type 2 diabetes mellitus

## 2020-09-25 NOTE — DIETITIAN INITIAL EVALUATION ADULT. - ADD RECOMMEND
1. Continue consistent carbohydrate diet no snacks. 2. Provided extensive T2DM nutrition therapy, all questions answered, RD remains available for reinforcement education PRN. 3. BMI alert placed. 4. Will continue to monitor PO intake, labs, weights, BM, skin, clinical course.

## 2020-09-26 ENCOUNTER — TRANSCRIPTION ENCOUNTER (OUTPATIENT)
Age: 35
End: 2020-09-26

## 2020-09-26 VITALS
HEART RATE: 90 BPM | RESPIRATION RATE: 20 BRPM | SYSTOLIC BLOOD PRESSURE: 110 MMHG | DIASTOLIC BLOOD PRESSURE: 69 MMHG | OXYGEN SATURATION: 96 % | TEMPERATURE: 98 F

## 2020-09-26 LAB
GLUCOSE BLDC GLUCOMTR-MCNC: 113 MG/DL — HIGH (ref 70–99)
GLUCOSE BLDC GLUCOMTR-MCNC: 131 MG/DL — HIGH (ref 70–99)
GLUCOSE BLDC GLUCOMTR-MCNC: 177 MG/DL — HIGH (ref 70–99)
GLUCOSE BLDC GLUCOMTR-MCNC: 194 MG/DL — HIGH (ref 70–99)
GLUCOSE BLDC GLUCOMTR-MCNC: 236 MG/DL — HIGH (ref 70–99)

## 2020-09-26 PROCEDURE — 83735 ASSAY OF MAGNESIUM: CPT

## 2020-09-26 PROCEDURE — 82330 ASSAY OF CALCIUM: CPT

## 2020-09-26 PROCEDURE — 99285 EMERGENCY DEPT VISIT HI MDM: CPT

## 2020-09-26 PROCEDURE — 83036 HEMOGLOBIN GLYCOSYLATED A1C: CPT

## 2020-09-26 PROCEDURE — U0003: CPT

## 2020-09-26 PROCEDURE — 76770 US EXAM ABDO BACK WALL COMP: CPT

## 2020-09-26 PROCEDURE — 80048 BASIC METABOLIC PNL TOTAL CA: CPT

## 2020-09-26 PROCEDURE — 87086 URINE CULTURE/COLONY COUNT: CPT

## 2020-09-26 PROCEDURE — 86769 SARS-COV-2 COVID-19 ANTIBODY: CPT

## 2020-09-26 PROCEDURE — 85018 HEMOGLOBIN: CPT

## 2020-09-26 PROCEDURE — 84132 ASSAY OF SERUM POTASSIUM: CPT

## 2020-09-26 PROCEDURE — 84478 ASSAY OF TRIGLYCERIDES: CPT

## 2020-09-26 PROCEDURE — 82962 GLUCOSE BLOOD TEST: CPT

## 2020-09-26 PROCEDURE — 82947 ASSAY GLUCOSE BLOOD QUANT: CPT

## 2020-09-26 PROCEDURE — 36415 COLL VENOUS BLD VENIPUNCTURE: CPT

## 2020-09-26 PROCEDURE — 83605 ASSAY OF LACTIC ACID: CPT

## 2020-09-26 PROCEDURE — 85027 COMPLETE CBC AUTOMATED: CPT

## 2020-09-26 PROCEDURE — 82435 ASSAY OF BLOOD CHLORIDE: CPT

## 2020-09-26 PROCEDURE — 84295 ASSAY OF SERUM SODIUM: CPT

## 2020-09-26 PROCEDURE — 87040 BLOOD CULTURE FOR BACTERIA: CPT

## 2020-09-26 PROCEDURE — 80061 LIPID PANEL: CPT

## 2020-09-26 PROCEDURE — 81001 URINALYSIS AUTO W/SCOPE: CPT

## 2020-09-26 PROCEDURE — 86337 INSULIN ANTIBODIES: CPT

## 2020-09-26 PROCEDURE — 85025 COMPLETE CBC W/AUTO DIFF WBC: CPT

## 2020-09-26 PROCEDURE — 93005 ELECTROCARDIOGRAM TRACING: CPT

## 2020-09-26 PROCEDURE — 86341 ISLET CELL ANTIBODY: CPT

## 2020-09-26 PROCEDURE — 80053 COMPREHEN METABOLIC PANEL: CPT

## 2020-09-26 PROCEDURE — 82803 BLOOD GASES ANY COMBINATION: CPT

## 2020-09-26 PROCEDURE — 82010 KETONE BODYS QUAN: CPT

## 2020-09-26 PROCEDURE — 85014 HEMATOCRIT: CPT

## 2020-09-26 PROCEDURE — 71045 X-RAY EXAM CHEST 1 VIEW: CPT

## 2020-09-26 RX ORDER — ATORVASTATIN CALCIUM 80 MG/1
1 TABLET, FILM COATED ORAL
Qty: 30 | Refills: 0
Start: 2020-09-26 | End: 2020-10-25

## 2020-09-26 RX ORDER — FENOFIBRATE,MICRONIZED 130 MG
1 CAPSULE ORAL
Qty: 30 | Refills: 0
Start: 2020-09-26 | End: 2020-10-25

## 2020-09-26 RX ORDER — CEFUROXIME AXETIL 250 MG
1 TABLET ORAL
Qty: 8 | Refills: 0
Start: 2020-09-26 | End: 2020-09-29

## 2020-09-26 RX ADMIN — Medication 18 UNIT(S): at 09:23

## 2020-09-26 RX ADMIN — Medication 145 MILLIGRAM(S): at 11:51

## 2020-09-26 RX ADMIN — LOSARTAN POTASSIUM 100 MILLIGRAM(S): 100 TABLET, FILM COATED ORAL at 05:05

## 2020-09-26 RX ADMIN — Medication 81 MILLIGRAM(S): at 11:51

## 2020-09-26 RX ADMIN — HEPARIN SODIUM 5000 UNIT(S): 5000 INJECTION INTRAVENOUS; SUBCUTANEOUS at 05:05

## 2020-09-26 RX ADMIN — Medication 2: at 12:36

## 2020-09-26 RX ADMIN — Medication 18 UNIT(S): at 12:36

## 2020-09-26 RX ADMIN — Medication 4: at 09:23

## 2020-09-26 RX ADMIN — BICTEGRAVIR SODIUM, EMTRICITABINE, AND TENOFOVIR ALAFENAMIDE FUMARATE 1 TABLET(S): 30; 120; 15 TABLET ORAL at 11:51

## 2020-09-26 NOTE — CONSULT NOTE ADULT - SUBJECTIVE AND OBJECTIVE BOX
HPI:   Patient is a 35y male with PMH significant for Biktarvy, & obesity, went to his PCP for routine check up & was found to have blood glucose of >1000 mg/dl, he was advised to go the ER for further mgmt. He was otherwise feeling well.    Here found to be in DKA with acidosis, ketonuria, glucosuria & large LE. Admitted to urinary frequency, was started on IV ceftriaxone. Seen by endocrine for new onset of DM. DKA has resolved. Reports that he continues to feel well. Denies any frequency, burning, hematuria, discharge or any other  issues at thsi point.     REVIEW OF SYSTEMS:  All other review of systems negative (Comprehensive ROS) except as above     PAST MEDICAL & SURGICAL HISTORY:  HIV disease  HTN (hypertension)  No significant past surgical history    Allergies  Sulfa drugs (Unknown)    Intolerances    Antimicrobials Day #  : 2  bictegravir 50 mG/emtricitabine 200 mG/tenofovir alafenamide 25 mG (BIKTARVY) 1 Tablet(s) Oral daily  cefTRIAXone  IVPB 1000 milliGRAM(s) IV Intermittent every 24 hours    Other Medications:  acetaminophen   Tablet .. 650 milliGRAM(s) Oral every 6 hours PRN  aspirin enteric coated 81 milliGRAM(s) Oral daily  atorvastatin 40 milliGRAM(s) Oral at bedtime  dextrose 40% Gel 15 Gram(s) Oral once PRN  dextrose 5%. 1000 milliLiter(s) IV Continuous <Continuous>  dextrose 50% Injectable 12.5 Gram(s) IV Push once  dextrose 50% Injectable 25 Gram(s) IV Push once  dextrose 50% Injectable 25 Gram(s) IV Push once  fenofibrate Tablet 145 milliGRAM(s) Oral daily  glucagon  Injectable 1 milliGRAM(s) IntraMuscular once PRN  heparin   Injectable 5000 Unit(s) SubCutaneous every 12 hours  insulin glargine Injectable (LANTUS) 40 Unit(s) SubCutaneous <User Schedule>  insulin lispro (HumaLOG) corrective regimen sliding scale   SubCutaneous three times a day before meals  insulin lispro (HumaLOG) corrective regimen sliding scale   SubCutaneous at bedtime  insulin lispro Injectable (HumaLOG) 18 Unit(s) SubCutaneous three times a day before meals  losartan 100 milliGRAM(s) Oral daily  sodium chloride 0.9%. 1000 milliLiter(s) IV Continuous <Continuous>      FAMILY HISTORY:  Reports all first deg relatives have DM.     SOCIAL HISTORY:  Smoking: No    ETOH: No    Drug Use: No  Single     T(F): 97.5 (09-26-20 @ 04:35), Max: 98.7 (09-25-20 @ 14:02)  HR: 88 (09-26-20 @ 04:35)  BP: 139/80 (09-26-20 @ 04:35)  RR: 18 (09-26-20 @ 04:35)  SpO2: 96% (09-26-20 @ 04:35)  Wt(kg): --    PHYSICAL EXAM:  General: alert, no acute distress  Eyes:  anicteric, no conjunctival injection, no discharge  Oropharynx: no lesions or injection 	  Neck: supple, without adenopathy  Lungs: clear to auscultation  Heart: regular rate and rhythm; no murmur, rubs or gallops  Abdomen: soft, nondistended, nontender, without mass or organomegaly  Extremities: no clubbing, cyanosis, or edema  Neurologic: alert, oriented, moves all extremities    LAB RESULTS:    09-25    133<L>  |  98  |  16  ----------------------------<  298<H>  3.9   |  23  |  1.30    Ca    9.2      25 Sep 2020 06:12            MICROBIOLOGY:  RECENT CULTURES:  09-23 @ 21:32 .Blood Blood     No growth to date.      09-23 @ 20:55 .Urine Clean Catch (Midstream)     >100,000 CFU/ml Streptococcus agalactiae (Group B)  Streptococcus agalactiae (Group B) isolated  Group B streptococci are susceptible to ampicillin,  penicillin and cefazolin, but may be resistant to  erythromycin and clindamycin.  Recommendations for intrapartum prophylaxis for Group B  streptococci are penicillin or ampicillin.            RADIOLOGY REVIEWED:  
  HPI: 34 y/o M with no PMH presents to the ED after being told by his PCP that his glucoses are too high. Admitted for new onset diabetes.     Endocrine History:  No hx of diabetes or prediabetes but has not seen doctor in a long time. Now with symptoms of polyuria and polydispia for 2-3 days. Weight gain of 30 lbs during the pandemic. FH of T2DM in mother, aunts and uncles.     PAST MEDICAL & SURGICAL HISTORY:  No pertinent past medical history    No significant past surgical history        FAMILY HISTORY: FH of T2Dm in mother, aunts and uncles.       Social History: No history of tobacco, etoh or illicit drug use.     Outpatient Medications: Home Medications:      MEDICATIONS  (STANDING):  cefTRIAXone   IVPB 1000 milliGRAM(s) IV Intermittent every 24 hours  dextrose 5%. 1000 milliLiter(s) (50 mL/Hr) IV Continuous <Continuous>  dextrose 50% Injectable 12.5 Gram(s) IV Push once  dextrose 50% Injectable 25 Gram(s) IV Push once  dextrose 50% Injectable 25 Gram(s) IV Push once  insulin lispro (HumaLOG) corrective regimen sliding scale   SubCutaneous every 4 hours    MEDICATIONS  (PRN):  dextrose 40% Gel 15 Gram(s) Oral once PRN Blood Glucose LESS THAN 70 milliGRAM(s)/deciLiter  glucagon  Injectable 1 milliGRAM(s) IntraMuscular once PRN Glucose <70 milliGRAM(s)/deciLiter      Allergies    sulfa drugs (Unknown)    Intolerances      Review of Systems:  Constitutional: No fever, chills   Neuro: No tremors, headache   Cardiovascular: No chest pain, palpitations  Respiratory: No SOB, no cough  GI: No nausea, vomiting, abdominal pain  : No dysuria, polyuria   Skin: no rash, ulcers   Psych: no depression, anxiety   Endocrine: no polyphagia, polydipsia     ALL OTHER SYSTEMS REVIEWED AND NEGATIVE        PHYSICAL EXAM:  VITALS: T(C): 36.7 (09-23-20 @ 16:23)  T(F): 98.1 (09-23-20 @ 16:23), Max: 98.2 (09-23-20 @ 10:51)  HR: 75 (09-23-20 @ 16:23) (75 - 102)  BP: 122/81 (09-23-20 @ 16:23) (122/81 - 144/92)  RR:  (18 - 20)  SpO2:  (97% - 99%)  Wt(kg): --  GENERAL: NAD, well-groomed, well-developed  EYES: No proptosis, anicteric  HEENT:  Atraumatic, Normocephalic, moist mucous membranes  RESPIRATORY: Clear to auscultation bilaterally; No rales, rhonchi, wheezing  CARDIOVASCULAR: Regular rate and rhythm; No murmurs  GI: Soft, nontender, non distended, normal bowel sounds  SKIN: Dry, intact, No rashes or lesions  NEURO: AOx3, moves all extremities spontaneously   PSYCH: Reactive affect, euthymic mood    POCT Blood Glucose.: 373 mg/dL (09-23-20 @ 15:45)  POCT Blood Glucose.: 461 mg/dL (09-23-20 @ 13:06)  POCT Blood Glucose.: 571 mg/dL (09-23-20 @ 11:14)  POCT Blood Glucose.: 572 mg/dL (09-23-20 @ 10:56)  POCT Blood Glucose.: 571 mg/dL (09-23-20 @ 10:54)                            15.4   8.91  )-----------( 293      ( 23 Sep 2020 11:44 )             42.9       09-23    132<L>  |  90<L>  |  21  ----------------------------<  590<HH>  4.4   |  24  |  1.25    EGFR if : 85  EGFR if non : 73    Ca    10.4      09-23  Mg     2.1     09-23    TPro  8.8<H>  /  Alb  4.9  /  TBili  0.5  /  DBili  x   /  AST  56<H>  /  ALT  32  /  AlkPhos  88  09-23      Thyroid Function Tests:      09-23 Chol -- LDL -- HDL -- Trig 723<H>, 09-23 Chol -- LDL -- HDL -- Trig 1153<H>    Hemoglobin A1C     Radiology:

## 2020-09-26 NOTE — PROGRESS NOTE ADULT - ASSESSMENT
35 m with     DKA/ New Diabetes Mellitus   - resolved  - BS control  - Endocrine follow    HIV  - continue Rx    UTI  - U C noted   - Ceftriaxone. Change to Ceftin 500 bid for 7 days  - ID evaluation noted    Morbid Obesity  - Nutrition education    Hyperlipidemia  - start Statin and Tricor  - nutrition education     DVT prophylaxis    DC home . Close follow with PMD/ Endocrine in 3-4 days. QA    Abhishek Gutierrez MD pager [phone]

## 2020-09-26 NOTE — PROGRESS NOTE ADULT - NUTRITIONAL ASSESSMENT
This patient has been assessed with a concern for Malnutrition and has been determined to have a diagnosis/diagnoses of Morbid obesity/BMI > 40.    This patient is being managed with:   Diet Regular-  Consistent Carbohydrate {No Snacks} (CSTCHO)  Entered: Sep 23 2020  4:50PM    
This patient has been assessed with a concern for Malnutrition and has been determined to have a diagnosis/diagnoses of Morbid obesity/BMI > 40.    This patient is being managed with:   Diet Regular-  Consistent Carbohydrate {No Snacks} (CSTCHO)  Entered: Sep 23 2020  4:50PM

## 2020-09-26 NOTE — CHART NOTE - NSCHARTNOTEFT_GEN_A_CORE
Pt medically cleared by Dr. Gutierrez for discharge home. Continue current medication regimen. Pt able to self administer insulin using pen as demonstrated by RN. Advised on diet and hypo/hyper glycemia. Pt will follow up with endo clinic in 2 weeks. Continue ceftin x 4 more days as per ID for UTI. Follow up with Dr. Oneal for HIV management.     Noted  prior to tonights dinner. Discussed with valentin Menendez, to get 15 units tonight. Stay on 18 units premeal and 40 lantus as likely will be eating better at home.

## 2020-09-26 NOTE — DISCHARGE NOTE NURSING/CASE MANAGEMENT/SOCIAL WORK - PATIENT PORTAL LINK FT
You can access the FollowMyHealth Patient Portal offered by [facility] by registering at the following website: [email or web address]. By joining [employer]’s FollowMyHealth portal, you will also be able to view your health information using other applications (apps) compatible with our system.

## 2020-09-26 NOTE — CONSULT NOTE ADULT - ASSESSMENT
35y male with PMH significant for Biktarvy, & obesity, was found to have blood glucose of >1000 mg/dl on routine check, referred to the ER for further mgmt.   Here found to be in DKA with acidosis, ketonuria, glucosuria & large LE.  Admitted to urinary frequency, was started on IV ceftriaxone.   DKA has resolved.   Denies any frequency, burning, hematuria, discharge or any other  issues at this point.   Urine cx was a contaminated specimen & polymicrobial growth noted  Blood cx without growth & he remains afebrile    PLAN:  Unclear if initial urinary frequency was by glucosuria & DKA & he was developing a true UTI or if a UTI led him to develop DKA.  Would complete a 7 days empiric course for suspected  UTI  Continue empiric Ceftriaxone in house  Can change antibiotics to PO ceftin 500 mg BID upon d/c home  Thank you

## 2020-09-26 NOTE — PROGRESS NOTE ADULT - SUBJECTIVE AND OBJECTIVE BOX
Chief Complaint: T2DM     History: Javan might have been loose, not recording any glucoses for the past 12 hours. New one placed. No complaints today, finishing his meals.     MEDICATIONS  (STANDING):  aspirin enteric coated 81 milliGRAM(s) Oral daily  atorvastatin 40 milliGRAM(s) Oral at bedtime  bictegravir 50 mG/emtricitabine 200 mG/tenofovir alafenamide 25 mG (BIKTARVY) 1 Tablet(s) Oral daily  cefTRIAXone   IVPB 1000 milliGRAM(s) IV Intermittent every 24 hours  dextrose 5%. 1000 milliLiter(s) (50 mL/Hr) IV Continuous <Continuous>  dextrose 50% Injectable 12.5 Gram(s) IV Push once  dextrose 50% Injectable 25 Gram(s) IV Push once  dextrose 50% Injectable 25 Gram(s) IV Push once  fenofibrate Tablet 145 milliGRAM(s) Oral daily  heparin   Injectable 5000 Unit(s) SubCutaneous every 12 hours  insulin glargine Injectable (LANTUS) 40 Unit(s) SubCutaneous <User Schedule>  insulin lispro (HumaLOG) corrective regimen sliding scale   SubCutaneous three times a day before meals  insulin lispro (HumaLOG) corrective regimen sliding scale   SubCutaneous at bedtime  insulin lispro Injectable (HumaLOG) 18 Unit(s) SubCutaneous three times a day before meals  losartan 100 milliGRAM(s) Oral daily  sodium chloride 0.9%. 1000 milliLiter(s) (75 mL/Hr) IV Continuous <Continuous>    MEDICATIONS  (PRN):  acetaminophen   Tablet .. 650 milliGRAM(s) Oral every 6 hours PRN Temp greater or equal to 38.5C (101.3F), Mild Pain (1 - 3)  dextrose 40% Gel 15 Gram(s) Oral once PRN Blood Glucose LESS THAN 70 milliGRAM(s)/deciLiter  glucagon  Injectable 1 milliGRAM(s) IntraMuscular once PRN Glucose <70 milliGRAM(s)/deciLiter      Allergies    sulfa drugs (Unknown)    Intolerances    ROS: All other systems reviewed and negative    PHYSICAL EXAM:  VITALS: T(C): 36.4 (09-25-20 @ 05:25)  T(F): 97.5 (09-25-20 @ 05:25), Max: 98 (09-24-20 @ 21:00)  HR: 76 (09-25-20 @ 05:25) (76 - 78)  BP: 135/67 (09-25-20 @ 05:25) (110/70 - 135/67)  RR:  (18 - 18)  SpO2:  (95% - 97%)  Wt(kg): --  GENERAL: NAD, well-groomed, well-developed  EYES: No proptosis,  anicteric  HEENT:  Atraumatic, Normocephalic, moist mucous membranes  RESPIRATORY: Clear to auscultation bilaterally; No rales, rhonchi, wheezing, or rubs  CARDIOVASCULAR: Regular rate and rhythm; No murmurs  NEURO: AOx3, moves all extremities spontaenuously   PSYCH:  reactive affect, euthymic mood      POCT Blood Glucose.: 276 mg/dL (09-25-20 @ 12:46)  POCT Blood Glucose.: 298 mg/dL (09-25-20 @ 09:07)  POCT Blood Glucose.: 341 mg/dL (09-24-20 @ 21:05)  POCT Blood Glucose.: 346 mg/dL (09-24-20 @ 17:56)  POCT Blood Glucose.: 302 mg/dL (09-24-20 @ 13:18)  POCT Blood Glucose.: 277 mg/dL (09-24-20 @ 09:51)  POCT Blood Glucose.: 273 mg/dL (09-24-20 @ 05:59)  POCT Blood Glucose.: 246 mg/dL (09-24-20 @ 02:00)  POCT Blood Glucose.: 294 mg/dL (09-23-20 @ 22:00)  POCT Blood Glucose.: 363 mg/dL (09-23-20 @ 19:29)  POCT Blood Glucose.: 327 mg/dL (09-23-20 @ 18:26)  POCT Blood Glucose.: 373 mg/dL (09-23-20 @ 15:45)  POCT Blood Glucose.: 461 mg/dL (09-23-20 @ 13:06)  POCT Blood Glucose.: 571 mg/dL (09-23-20 @ 11:14)  POCT Blood Glucose.: 572 mg/dL (09-23-20 @ 10:56)  POCT Blood Glucose.: 571 mg/dL (09-23-20 @ 10:54)      09-25    133<L>  |  98  |  16  ----------------------------<  298<H>  3.9   |  23  |  1.30    EGFR if : 81  EGFR if non : 70    Ca    9.2      09-25  Mg     2.1     09-23    TPro  8.8<H>  /  Alb  4.9  /  TBili  0.5  /  DBili  x   /  AST  56<H>  /  ALT  32  /  AlkPhos  88  09-23          Thyroid Function Tests:                      
Patient is a 35y old  Male who presents with a chief complaint of Dr. Gutierrez (25 Sep 2020 13:40)      SUBJECTIVE / OVERNIGHT EVENTS: Comfortable without new complaints.   Review of Systems  chest pain no  palpitations no  sob no  nausea no  headache no    MEDICATIONS  (STANDING):  aspirin enteric coated 81 milliGRAM(s) Oral daily  atorvastatin 40 milliGRAM(s) Oral at bedtime  bictegravir 50 mG/emtricitabine 200 mG/tenofovir alafenamide 25 mG (BIKTARVY) 1 Tablet(s) Oral daily  cefTRIAXone   IVPB 1000 milliGRAM(s) IV Intermittent every 24 hours  dextrose 5%. 1000 milliLiter(s) (50 mL/Hr) IV Continuous <Continuous>  dextrose 50% Injectable 12.5 Gram(s) IV Push once  dextrose 50% Injectable 25 Gram(s) IV Push once  dextrose 50% Injectable 25 Gram(s) IV Push once  fenofibrate Tablet 145 milliGRAM(s) Oral daily  heparin   Injectable 5000 Unit(s) SubCutaneous every 12 hours  insulin glargine Injectable (LANTUS) 40 Unit(s) SubCutaneous <User Schedule>  insulin lispro (HumaLOG) corrective regimen sliding scale   SubCutaneous three times a day before meals  insulin lispro (HumaLOG) corrective regimen sliding scale   SubCutaneous at bedtime  insulin lispro Injectable (HumaLOG) 18 Unit(s) SubCutaneous three times a day before meals  losartan 100 milliGRAM(s) Oral daily  sodium chloride 0.9%. 1000 milliLiter(s) (75 mL/Hr) IV Continuous <Continuous>    MEDICATIONS  (PRN):  acetaminophen   Tablet .. 650 milliGRAM(s) Oral every 6 hours PRN Temp greater or equal to 38.5C (101.3F), Mild Pain (1 - 3)  dextrose 40% Gel 15 Gram(s) Oral once PRN Blood Glucose LESS THAN 70 milliGRAM(s)/deciLiter  glucagon  Injectable 1 milliGRAM(s) IntraMuscular once PRN Glucose <70 milliGRAM(s)/deciLiter      Vital Signs Last 24 Hrs  T(C): 36.7 (26 Sep 2020 13:43), Max: 36.7 (25 Sep 2020 21:00)  T(F): 98.1 (26 Sep 2020 13:43), Max: 98.1 (25 Sep 2020 21:00)  HR: 90 (26 Sep 2020 13:43) (78 - 90)  BP: 110/69 (26 Sep 2020 13:43) (110/69 - 139/80)  BP(mean): --  RR: 20 (26 Sep 2020 13:43) (18 - 20)  SpO2: 96% (26 Sep 2020 13:43) (96% - 98%)    PHYSICAL EXAM:  GENERAL: NAD, well-developed  HEAD:  Atraumatic, Normocephalic  EYES: EOMI, PERRLA, conjunctiva and sclera clear  NECK: Supple, No JVD  CHEST/LUNG: Clear to auscultation bilaterally; No wheeze  HEART: Regular rate and rhythm; No murmurs, rubs, or gallops  ABDOMEN: Soft, Nontender, Nondistended; Bowel sounds present  EXTREMITIES:  2+ Peripheral Pulses, No clubbing, cyanosis, or edema  PSYCH: AAOx3  NEUROLOGY: non-focal  SKIN: No rashes or lesions    LABS:    09-25    133<L>  |  98  |  16  ----------------------------<  298<H>  3.9   |  23  |  1.30    Ca    9.2      25 Sep 2020 06:12                Culture - Blood (collected 23 Sep 2020 21:32)  Source: .Blood Blood  Preliminary Report (24 Sep 2020 22:02):    No growth to date.    Culture - Blood (collected 23 Sep 2020 21:32)  Source: .Blood Blood  Preliminary Report (24 Sep 2020 22:02):    No growth to date.    Culture - Urine (collected 23 Sep 2020 20:55)  Source: .Urine Clean Catch (Midstream)  Final Report (24 Sep 2020 19:40):    >100,000 CFU/ml Streptococcus agalactiae (Group B)    Streptococcus agalactiae (Group B) isolated    Group B streptococci are susceptible to ampicillin,    penicillin and cefazolin, but may be resistant to    erythromycin and clindamycin.    Recommendations for intrapartum prophylaxis for Group B    streptococci are penicillin or ampicillin.        RADIOLOGY & ADDITIONAL TESTS:    Imaging Personally Reviewed:    Consultant(s) Notes Reviewed:      Care Discussed with Consultants/Other Providers:  
Patient is a 35y old  Male who presents with a chief complaint of Dr. Gutierrez (25 Sep 2020 13:40)      SUBJECTIVE / OVERNIGHT EVENTS: Comfortable without new complaints. Feels better  Review of Systems  chest pain no  palpitations no  sob no  nausea no  headache no    MEDICATIONS  (STANDING):  aspirin enteric coated 81 milliGRAM(s) Oral daily  atorvastatin 40 milliGRAM(s) Oral at bedtime  bictegravir 50 mG/emtricitabine 200 mG/tenofovir alafenamide 25 mG (BIKTARVY) 1 Tablet(s) Oral daily  cefTRIAXone   IVPB 1000 milliGRAM(s) IV Intermittent every 24 hours  dextrose 5%. 1000 milliLiter(s) (50 mL/Hr) IV Continuous <Continuous>  dextrose 50% Injectable 12.5 Gram(s) IV Push once  dextrose 50% Injectable 25 Gram(s) IV Push once  dextrose 50% Injectable 25 Gram(s) IV Push once  fenofibrate Tablet 145 milliGRAM(s) Oral daily  heparin   Injectable 5000 Unit(s) SubCutaneous every 12 hours  insulin glargine Injectable (LANTUS) 40 Unit(s) SubCutaneous <User Schedule>  insulin lispro (HumaLOG) corrective regimen sliding scale   SubCutaneous three times a day before meals  insulin lispro (HumaLOG) corrective regimen sliding scale   SubCutaneous at bedtime  insulin lispro Injectable (HumaLOG) 18 Unit(s) SubCutaneous three times a day before meals  losartan 100 milliGRAM(s) Oral daily  sodium chloride 0.9%. 1000 milliLiter(s) (75 mL/Hr) IV Continuous <Continuous>    MEDICATIONS  (PRN):  acetaminophen   Tablet .. 650 milliGRAM(s) Oral every 6 hours PRN Temp greater or equal to 38.5C (101.3F), Mild Pain (1 - 3)  dextrose 40% Gel 15 Gram(s) Oral once PRN Blood Glucose LESS THAN 70 milliGRAM(s)/deciLiter  glucagon  Injectable 1 milliGRAM(s) IntraMuscular once PRN Glucose <70 milliGRAM(s)/deciLiter      Vital Signs Last 24 Hrs  T(C): 37.1 (25 Sep 2020 14:02), Max: 37.1 (25 Sep 2020 14:02)  T(F): 98.7 (25 Sep 2020 14:02), Max: 98.7 (25 Sep 2020 14:02)  HR: 77 (25 Sep 2020 14:02) (76 - 78)  BP: 133/66 (25 Sep 2020 14:02) (110/70 - 135/67)  BP(mean): --  RR: 18 (25 Sep 2020 14:02) (18 - 18)  SpO2: 96% (25 Sep 2020 14:02) (95% - 97%)    PHYSICAL EXAM:  GENERAL: NAD, well-developed  HEAD:  Atraumatic, Normocephalic  EYES: EOMI, PERRLA, conjunctiva and sclera clear  NECK: Supple, No JVD  CHEST/LUNG: Clear to auscultation bilaterally; No wheeze  HEART: Regular rate and rhythm; No murmurs, rubs, or gallops  ABDOMEN: Soft, Nontender, Nondistended; Bowel sounds present  EXTREMITIES:  2+ Peripheral Pulses, No clubbing, cyanosis, or edema  PSYCH: AAOx3  NEUROLOGY: non-focal  SKIN: No rashes or lesions    LABS:                        14.2   6.88  )-----------( 257      ( 24 Sep 2020 06:20 )             41.2     09-25    133<L>  |  98  |  16  ----------------------------<  298<H>  3.9   |  23  |  1.30    Ca    9.2      25 Sep 2020 06:12                Culture - Blood (collected 23 Sep 2020 21:32)  Source: .Blood Blood  Preliminary Report (24 Sep 2020 22:02):    No growth to date.    Culture - Blood (collected 23 Sep 2020 21:32)  Source: .Blood Blood  Preliminary Report (24 Sep 2020 22:02):    No growth to date.    Culture - Urine (collected 23 Sep 2020 20:55)  Source: .Urine Clean Catch (Midstream)  Final Report (24 Sep 2020 19:40):    >100,000 CFU/ml Streptococcus agalactiae (Group B)    Streptococcus agalactiae (Group B) isolated    Group B streptococci are susceptible to ampicillin,    penicillin and cefazolin, but may be resistant to    erythromycin and clindamycin.    Recommendations for intrapartum prophylaxis for Group B    streptococci are penicillin or ampicillin.        RADIOLOGY & ADDITIONAL TESTS:    Imaging Personally Reviewed:    Consultant(s) Notes Reviewed:      Care Discussed with Consultants/Other Providers:  
Patient is a 35y old  Male who presents with a chief complaint of New onset DM with DKA (24 Sep 2020 16:14)      SUBJECTIVE / OVERNIGHT EVENTS: Comfortable without new complaints.   Review of Systems  chest pain no  palpitations no  sob no  nausea no  headache no    MEDICATIONS  (STANDING):  aspirin enteric coated 81 milliGRAM(s) Oral daily  bictegravir 50 mG/emtricitabine 200 mG/tenofovir alafenamide 25 mG (BIKTARVY) 1 Tablet(s) Oral daily  cefTRIAXone   IVPB 1000 milliGRAM(s) IV Intermittent every 24 hours  dextrose 5%. 1000 milliLiter(s) (50 mL/Hr) IV Continuous <Continuous>  dextrose 50% Injectable 12.5 Gram(s) IV Push once  dextrose 50% Injectable 25 Gram(s) IV Push once  dextrose 50% Injectable 25 Gram(s) IV Push once  heparin   Injectable 5000 Unit(s) SubCutaneous every 12 hours  insulin glargine Injectable (LANTUS) 32 Unit(s) SubCutaneous <User Schedule>  insulin lispro (HumaLOG) corrective regimen sliding scale   SubCutaneous three times a day before meals  insulin lispro (HumaLOG) corrective regimen sliding scale   SubCutaneous at bedtime  insulin lispro Injectable (HumaLOG) 11 Unit(s) SubCutaneous three times a day before meals  losartan 100 milliGRAM(s) Oral daily  sodium chloride 0.9%. 1000 milliLiter(s) (75 mL/Hr) IV Continuous <Continuous>    MEDICATIONS  (PRN):  acetaminophen   Tablet .. 650 milliGRAM(s) Oral every 6 hours PRN Temp greater or equal to 38.5C (101.3F), Mild Pain (1 - 3)  dextrose 40% Gel 15 Gram(s) Oral once PRN Blood Glucose LESS THAN 70 milliGRAM(s)/deciLiter  glucagon  Injectable 1 milliGRAM(s) IntraMuscular once PRN Glucose <70 milliGRAM(s)/deciLiter      Vital Signs Last 24 Hrs  T(C): 36.9 (24 Sep 2020 13:23), Max: 36.9 (24 Sep 2020 13:23)  T(F): 98.4 (24 Sep 2020 13:23), Max: 98.4 (24 Sep 2020 13:23)  HR: 86 (24 Sep 2020 13:23) (66 - 91)  BP: 114/69 (24 Sep 2020 13:23) (114/69 - 147/83)  BP(mean): --  RR: 18 (24 Sep 2020 13:23) (18 - 18)  SpO2: 96% (24 Sep 2020 13:23) (96% - 99%)    PHYSICAL EXAM:  GENERAL: NAD, well-developed  HEAD:  Atraumatic, Normocephalic  EYES: EOMI, PERRLA, conjunctiva and sclera clear  NECK: Supple, No JVD  CHEST/LUNG: Clear to auscultation bilaterally; No wheeze  HEART: Regular rate and rhythm; No murmurs, rubs, or gallops  ABDOMEN: Soft, Nontender, Nondistended; Bowel sounds present  EXTREMITIES:  2+ Peripheral Pulses, No clubbing, cyanosis, or edema  PSYCH: AAOx3  NEUROLOGY: non-focal  SKIN: No rashes or lesions    LABS:                        14.2   6.88  )-----------( 257      ( 24 Sep 2020 06:20 )             41.2     24    134<L>  |  95<L>  |  16  ----------------------------<  287<H>  3.7   |  26  |  1.28    Ca    9.5      24 Sep 2020 06:20  Mg     2.1         TPro  8.8<H>  /  Alb  4.9  /  TBili  0.5  /  DBili  x   /  AST  56<H>  /  ALT  32  /  AlkPhos  88            Urinalysis Basic - ( 23 Sep 2020 11:38 )    Color: Colorless / Appearance: Clear / S.024 / pH: x  Gluc: x / Ketone: Small  / Bili: Negative / Urobili: Negative   Blood: x / Protein: Negative / Nitrite: Negative   Leuk Esterase: Large / RBC: 3 /hpf / WBC 16 /HPF   Sq Epi: x / Non Sq Epi: 1 /hpf / Bacteria: Negative          RADIOLOGY & ADDITIONAL TESTS:    Imaging Personally Reviewed:  < from: US Kidney and Bladder (20 @ 11:50) >  IMPRESSION:    Possible bladder wall thickening which may be secondary to underdistention versus urinary tract infection.    No focal obstruction or hydronephrosis.    < end of copied text >    Consultant(s) Notes Reviewed:      Care Discussed with Consultants/Other Providers:  
  Follow-up on: New onset DM with DKA    Subjective: Patient seen at bedside, reports feeling well and tolerating diet. DKA has now resolved but blood sugars still elevated to 200s-300s.     MEDICATIONS  (STANDING):  aspirin enteric coated 81 milliGRAM(s) Oral daily  bictegravir 50 mG/emtricitabine 200 mG/tenofovir alafenamide 25 mG (BIKTARVY) 1 Tablet(s) Oral daily  cefTRIAXone   IVPB 1000 milliGRAM(s) IV Intermittent every 24 hours  dextrose 5%. 1000 milliLiter(s) (50 mL/Hr) IV Continuous <Continuous>  dextrose 50% Injectable 12.5 Gram(s) IV Push once  dextrose 50% Injectable 25 Gram(s) IV Push once  dextrose 50% Injectable 25 Gram(s) IV Push once  heparin   Injectable 5000 Unit(s) SubCutaneous every 12 hours  insulin glargine Injectable (LANTUS) 32 Unit(s) SubCutaneous <User Schedule>  insulin lispro (HumaLOG) corrective regimen sliding scale   SubCutaneous every 4 hours  losartan 100 milliGRAM(s) Oral daily  sodium chloride 0.9%. 1000 milliLiter(s) (75 mL/Hr) IV Continuous <Continuous>    MEDICATIONS  (PRN):  acetaminophen   Tablet .. 650 milliGRAM(s) Oral every 6 hours PRN Temp greater or equal to 38.5C (101.3F), Mild Pain (1 - 3)  dextrose 40% Gel 15 Gram(s) Oral once PRN Blood Glucose LESS THAN 70 milliGRAM(s)/deciLiter  glucagon  Injectable 1 milliGRAM(s) IntraMuscular once PRN Glucose <70 milliGRAM(s)/deciLiter      PHYSICAL EXAM:  VITALS: T(C): 36.9 (09-24-20 @ 13:23)  T(F): 98.4 (09-24-20 @ 13:23), Max: 98.4 (09-24-20 @ 13:23)  HR: 86 (09-24-20 @ 13:23) (66 - 91)  BP: 114/69 (09-24-20 @ 13:23) (114/69 - 147/83)  RR:  (18 - 20)  SpO2:  (96% - 99%)  Wt(kg): --  GENERAL: NAD, well-groomed, well-developed  EYES: No proptosis, no injection  HEENT:  Atraumatic, Normocephalic  Neuro: AAO x 3  Psych: reactive affect, euthymic mood       POCT Blood Glucose.: 302 mg/dL (09-24-20 @ 13:18) H8  POCT Blood Glucose.: 277 mg/dL (09-24-20 @ 09:51) H6  POCT Blood Glucose.: 273 mg/dL (09-24-20 @ 05:59) H6  POCT Blood Glucose.: 246 mg/dL (09-24-20 @ 02:00) H4  POCT Blood Glucose.: 294 mg/dL (09-23-20 @ 22:00) H6  POCT Blood Glucose.: 363 mg/dL (09-23-20 @ 19:29)   POCT Blood Glucose.: 327 mg/dL (09-23-20 @ 18:26) H8  POCT Blood Glucose.: 373 mg/dL (09-23-20 @ 15:45) L26  POCT Blood Glucose.: 461 mg/dL (09-23-20 @ 13:06) L6  POCT Blood Glucose.: 571 mg/dL (09-23-20 @ 11:14)  POCT Blood Glucose.: 572 mg/dL (09-23-20 @ 10:56)  POCT Blood Glucose.: 571 mg/dL (09-23-20 @ 10:54)    09-24    134<L>  |  95<L>  |  16  ----------------------------<  287<H>  3.7   |  26  |  1.28    EGFR if : 82  EGFR if non : 71    Ca    9.5      09-24  Mg     2.1     09-23    TPro  8.8<H>  /  Alb  4.9  /  TBili  0.5  /  DBili  x   /  AST  56<H>  /  ALT  32  /  AlkPhos  88  09-23      A1C with Estimated Average Glucose Result: 11.4. % (09.23.20 @ 14:04)

## 2020-09-28 LAB
CULTURE RESULTS: SIGNIFICANT CHANGE UP
SPECIMEN SOURCE: SIGNIFICANT CHANGE UP
ZINC TRANSPORTER 8 AB, RESULT: <10 U/ML — SIGNIFICANT CHANGE UP (ref 0–15)

## 2020-09-29 LAB
GAD65 AB SER-MCNC: 0 NMOL/L — SIGNIFICANT CHANGE UP
ISLET CELL512 AB SER-SCNC: 0 NMOL/L — SIGNIFICANT CHANGE UP

## 2020-09-30 LAB
INSULIN HUMAN IGE QN: <0.4 U/ML — SIGNIFICANT CHANGE UP

## 2020-10-19 ENCOUNTER — NON-APPOINTMENT (OUTPATIENT)
Age: 35
End: 2020-10-19

## 2020-10-19 RX ORDER — LOPERAMIDE HYDROCHLORIDE 2 MG/1
2 CAPSULE ORAL TWICE DAILY
Qty: 20 | Refills: 0 | Status: DISCONTINUED | COMMUNITY
Start: 2018-01-30 | End: 2020-10-19

## 2020-10-19 RX ORDER — DOXYCYCLINE HYCLATE 100 MG/1
100 TABLET ORAL
Qty: 56 | Refills: 0 | Status: DISCONTINUED | COMMUNITY
Start: 2018-10-31 | End: 2020-10-19

## 2020-10-19 RX ORDER — CLOTRIMAZOLE 10 MG/G
1 CREAM TOPICAL TWICE DAILY
Qty: 1 | Refills: 3 | Status: DISCONTINUED | COMMUNITY
Start: 2018-01-30 | End: 2020-10-19

## 2020-10-19 RX ORDER — DOXYCYCLINE HYCLATE 100 MG/1
100 TABLET ORAL
Qty: 56 | Refills: 0 | Status: DISCONTINUED | COMMUNITY
Start: 2018-04-19 | End: 2020-10-19

## 2020-10-19 RX ORDER — FLUCONAZOLE 200 MG/1
200 TABLET ORAL
Qty: 30 | Refills: 0 | Status: DISCONTINUED | COMMUNITY
Start: 2018-02-09 | End: 2020-10-19

## 2020-10-23 ENCOUNTER — NON-APPOINTMENT (OUTPATIENT)
Age: 35
End: 2020-10-23

## 2020-10-23 ENCOUNTER — APPOINTMENT (OUTPATIENT)
Dept: ENDOCRINOLOGY | Facility: CLINIC | Age: 35
End: 2020-10-23
Payer: COMMERCIAL

## 2020-10-23 VITALS — WEIGHT: 315 LBS | BODY MASS INDEX: 46.96 KG/M2

## 2020-10-23 PROCEDURE — G0108 DIAB MANAGE TRN  PER INDIV: CPT

## 2020-10-23 PROCEDURE — 99072 ADDL SUPL MATRL&STAF TM PHE: CPT

## 2020-11-04 LAB — HBA1C MFR BLD HPLC: 9.5

## 2020-11-05 ENCOUNTER — APPOINTMENT (OUTPATIENT)
Dept: ENDOCRINOLOGY | Facility: CLINIC | Age: 35
End: 2020-11-05

## 2020-11-06 ENCOUNTER — APPOINTMENT (OUTPATIENT)
Dept: ENDOCRINOLOGY | Facility: CLINIC | Age: 35
End: 2020-11-06
Payer: COMMERCIAL

## 2020-11-06 PROCEDURE — 99072 ADDL SUPL MATRL&STAF TM PHE: CPT

## 2020-11-06 PROCEDURE — G0108 DIAB MANAGE TRN  PER INDIV: CPT

## 2020-11-11 ENCOUNTER — RX RENEWAL (OUTPATIENT)
Age: 35
End: 2020-11-11

## 2020-11-12 ENCOUNTER — RX RENEWAL (OUTPATIENT)
Age: 35
End: 2020-11-12

## 2020-11-12 ENCOUNTER — NON-APPOINTMENT (OUTPATIENT)
Age: 35
End: 2020-11-12

## 2020-11-23 NOTE — PATIENT PROFILE ADULT - NSPROPASSIVESMOKEEXPOSURE_GEN_A_NUR
You have tested positive for COVID-19 today.  Per the CDC, you are now in a 10 day isolation.      This isolation starts from the day you first developed symptoms, not the day of your positive test. For example, if your symptoms began on a Monday, and you waited until Friday of the same week to get tested, and it was positive, your 10 day isolation begins from that Monday, not the Friday you tested positive.        However, if you are asymptomatic (a person who does not have any symptoms), and received a COVID-19 test that was positive, your 10 day isolation begins on the day you tested positive.  This is regardless if you were exposed to a known positive days earlier.  So for example, if you test positive as an asymptomatic on day 7 from exposure, you have now extended your 14 day quarantine to a 17 day quarantine.      Also, per the CDC guidelines, once your 10 days have passed, and you have not had fever greater than 100.4F in the last 24 hours without taking any fever reducers such as Tylenol (Acetaminophen) or Motrin (Ibuprofen), you may return to your normal activities including social distancing, wearing masks, and frequent handwashing - YOU DO NOT NEED ANOTHER TEST, OR TO TEST NEGATIVE, IN ORDER TO END YOUR QUARANTINE!     Unknown

## 2020-12-02 ENCOUNTER — APPOINTMENT (OUTPATIENT)
Dept: ENDOCRINOLOGY | Facility: CLINIC | Age: 35
End: 2020-12-02

## 2020-12-09 ENCOUNTER — RX RENEWAL (OUTPATIENT)
Age: 35
End: 2020-12-09

## 2020-12-10 ENCOUNTER — RX RENEWAL (OUTPATIENT)
Age: 35
End: 2020-12-10

## 2020-12-10 RX ORDER — INSULIN ASPART 100 [IU]/ML
100 INJECTION, SOLUTION INTRAVENOUS; SUBCUTANEOUS 3 TIMES DAILY
Qty: 15 | Refills: 1 | Status: ACTIVE | COMMUNITY
Start: 2020-12-10 | End: 1900-01-01

## 2020-12-10 RX ORDER — INSULIN LISPRO 100 [IU]/ML
100 INJECTION, SOLUTION INTRAVENOUS; SUBCUTANEOUS 3 TIMES DAILY
Qty: 90 | Refills: 0 | Status: COMPLETED | COMMUNITY
Start: 2020-10-19 | End: 2020-12-10

## 2021-01-12 ENCOUNTER — APPOINTMENT (OUTPATIENT)
Dept: ENDOCRINOLOGY | Facility: CLINIC | Age: 36
End: 2021-01-12

## 2021-02-14 ENCOUNTER — RX RENEWAL (OUTPATIENT)
Age: 36
End: 2021-02-14

## 2021-02-15 ENCOUNTER — RX RENEWAL (OUTPATIENT)
Age: 36
End: 2021-02-15

## 2021-02-16 ENCOUNTER — RX RENEWAL (OUTPATIENT)
Age: 36
End: 2021-02-16

## 2021-02-24 ENCOUNTER — NON-APPOINTMENT (OUTPATIENT)
Age: 36
End: 2021-02-24

## 2021-03-16 ENCOUNTER — RX RENEWAL (OUTPATIENT)
Age: 36
End: 2021-03-16

## 2021-03-16 RX ORDER — BLOOD SUGAR DIAGNOSTIC
STRIP MISCELLANEOUS
Qty: 1 | Refills: 2 | Status: ACTIVE | COMMUNITY
Start: 2021-02-15

## 2021-04-15 ENCOUNTER — RX RENEWAL (OUTPATIENT)
Age: 36
End: 2021-04-15

## 2021-04-29 NOTE — END OF VISIT
[Time Spent: ___ minutes] : I have spent [unfilled] minutes of time on the encounter. [>50% of the face to face encounter time was spent on counseling and/or coordination of care for ___] : Greater than 50% of the face to face encounter time was spent on counseling and/or coordination of care for [unfilled] none

## 2021-05-13 ENCOUNTER — RX RENEWAL (OUTPATIENT)
Age: 36
End: 2021-05-13

## 2021-05-20 ENCOUNTER — RX RENEWAL (OUTPATIENT)
Age: 36
End: 2021-05-20

## 2021-06-08 ENCOUNTER — FORM ENCOUNTER (OUTPATIENT)
Age: 36
End: 2021-06-08

## 2021-06-09 ENCOUNTER — APPOINTMENT (OUTPATIENT)
Dept: INFECTIOUS DISEASE | Facility: CLINIC | Age: 36
End: 2021-06-09
Payer: COMMERCIAL

## 2021-06-09 VITALS
TEMPERATURE: 98.8 F | OXYGEN SATURATION: 97 % | BODY MASS INDEX: 46.65 KG/M2 | WEIGHT: 315 LBS | HEART RATE: 96 BPM | HEIGHT: 69 IN | DIASTOLIC BLOOD PRESSURE: 91 MMHG | SYSTOLIC BLOOD PRESSURE: 135 MMHG

## 2021-06-09 PROCEDURE — 99213 OFFICE O/P EST LOW 20 MIN: CPT

## 2021-06-09 PROCEDURE — 99072 ADDL SUPL MATRL&STAF TM PHE: CPT

## 2021-06-09 PROCEDURE — 97802 MEDICAL NUTRITION INDIV IN: CPT

## 2021-06-09 RX ORDER — LOSARTAN POTASSIUM AND HYDROCHLOROTHIAZIDE 12.5; 5 MG/1; MG/1
50-12.5 TABLET ORAL
Qty: 30 | Refills: 1 | Status: DISCONTINUED | COMMUNITY
Start: 2019-09-03 | End: 2021-06-09

## 2021-06-12 NOTE — ASSESSMENT
[Treatment Education] : treatment education [Treatment Adherence] : treatment adherence [Rx Dose / Side Effects] : Rx dose/side effects [Risk Reduction] : risk reduction [Nutritional / Food Issues] : nutritional/food issues [Universal Precautions] : universal precautions [Medical Care Issues] : medical care issues [Drug Interactions / Side Effects] : drug interactions/side effects [Disclosure of Diagnosis] : disclosure of diagnosis [HIV Education] : HIV Education [Sexuality / Safer Sex] : sexuality/safer sex [Anticipatory Guidance] : anticipatory guidance [Education Materials Provided] : Eduction materials were provided [FreeTextEntry1] : 35yo man with a history of a recent presentation of advanced AIDs, but steadily improving with ARVs and OI meds\par \par Sad about his Mom's passing and this has contributed to his weight gain, but he is back to work as a \par \par Will cotninue the Biktarvy\par \par Poor dentition needs multiple tooth extractions- dental referral has a private dentist\par \par HIV- needs annual labs sent today, HGBA1c, lipids, will check COVID antibodies post vaccine\par \par RPR- s/p treatment x 4 doses will need repeat titer today and may need further treatment\par \par cryptosporidial diarrhea- sxs. improved with ARVs no proven effective primary therapy but if sxs. recur will try Nitazoxinide\par \par HTN- needs diet and exercise and weight loss will see nutritiionist today but will also start a BP med Losartan-hctz and repeat BP in 1 week. If not effective at reducing will cotninue current anti HTN meds\par \par needs nutritionist referral for diet and exercise weight loss program\par \par May need Pulmonary referral for sleep apnea based upon physical appearance\par \par RTC in 4 mo

## 2021-06-12 NOTE — HISTORY OF PRESENT ILLNESS
[Sexually Active] : The patient is sexually active [Oral] : oral [Anal] : anal [Men] : with men [FreeTextEntry1] : 37 yo man who presented with advanced AIds and multiple OIs a few years ago. He has been steadily improving since starting ARVs\par He has been taking Biktarvy without side effects noted\par \par He has a history of confirmed crysptosporidial diarrhea- sxs. improved with ARVS\par \par He has a history of syphilis and is s/p treatment and will need repeat titers\par \par He drives long haul 18wheel trucks\par \par C/o weight gain over 300lbs at this point\par \par Mother passed away somewhat unexpectedly on [place] Day 2020- ? COVID related\par \par He needs follow up with Endocrine for his Diabetes management as well as Nutrition for weight loss counseling [Monogamous] : is not monogamous [de-identified] :  [de-identified] : mom

## 2021-06-13 ENCOUNTER — NON-APPOINTMENT (OUTPATIENT)
Age: 36
End: 2021-06-13

## 2021-06-15 ENCOUNTER — NON-APPOINTMENT (OUTPATIENT)
Age: 36
End: 2021-06-15

## 2021-06-18 ENCOUNTER — APPOINTMENT (OUTPATIENT)
Dept: INFECTIOUS DISEASE | Facility: CLINIC | Age: 36
End: 2021-06-18
Payer: COMMERCIAL

## 2021-06-18 ENCOUNTER — NON-APPOINTMENT (OUTPATIENT)
Age: 36
End: 2021-06-18

## 2021-06-18 ENCOUNTER — RX RENEWAL (OUTPATIENT)
Age: 36
End: 2021-06-18

## 2021-06-18 PROCEDURE — 99072 ADDL SUPL MATRL&STAF TM PHE: CPT

## 2021-06-18 PROCEDURE — 96372 THER/PROPH/DIAG INJ SC/IM: CPT

## 2021-06-18 RX ADMIN — PENICILLIN G BENZATHINE 0 UNIT/4ML: 2400000 INJECTION, SUSPENSION INTRAMUSCULAR at 00:00

## 2021-06-22 ENCOUNTER — NON-APPOINTMENT (OUTPATIENT)
Age: 36
End: 2021-06-22

## 2021-06-25 ENCOUNTER — NON-APPOINTMENT (OUTPATIENT)
Age: 36
End: 2021-06-25

## 2021-06-25 ENCOUNTER — APPOINTMENT (OUTPATIENT)
Dept: INFECTIOUS DISEASE | Facility: CLINIC | Age: 36
End: 2021-06-25

## 2021-06-30 ENCOUNTER — NON-APPOINTMENT (OUTPATIENT)
Age: 36
End: 2021-06-30

## 2021-07-02 ENCOUNTER — APPOINTMENT (OUTPATIENT)
Dept: INFECTIOUS DISEASE | Facility: CLINIC | Age: 36
End: 2021-07-02

## 2021-07-22 ENCOUNTER — RX RENEWAL (OUTPATIENT)
Age: 36
End: 2021-07-22

## 2021-08-05 NOTE — DISCHARGE NOTE PROVIDER - PROVIDER TOKENS
Patient ED room. Patient did provide a urine sample.    PROVIDER:[TOKEN:[[id number]]],PROVIDER:[TOKEN:[[id number]]]

## 2021-08-22 ENCOUNTER — RX RENEWAL (OUTPATIENT)
Age: 36
End: 2021-08-22

## 2021-09-20 ENCOUNTER — RX RENEWAL (OUTPATIENT)
Age: 36
End: 2021-09-20

## 2021-09-20 RX ORDER — PEN NEEDLE, DIABETIC 31 G X1/4"
32G X 4 MM NEEDLE, DISPOSABLE MISCELLANEOUS
Qty: 1 | Refills: 2 | Status: ACTIVE | COMMUNITY
Start: 2021-02-15

## 2021-09-30 ENCOUNTER — NON-APPOINTMENT (OUTPATIENT)
Age: 36
End: 2021-09-30

## 2021-10-10 ENCOUNTER — FORM ENCOUNTER (OUTPATIENT)
Age: 36
End: 2021-10-10

## 2021-10-11 ENCOUNTER — LABORATORY RESULT (OUTPATIENT)
Age: 36
End: 2021-10-11

## 2021-10-11 ENCOUNTER — NON-APPOINTMENT (OUTPATIENT)
Age: 36
End: 2021-10-11

## 2021-10-11 ENCOUNTER — APPOINTMENT (OUTPATIENT)
Dept: INFECTIOUS DISEASE | Facility: CLINIC | Age: 36
End: 2021-10-11
Payer: COMMERCIAL

## 2021-10-11 VITALS
BODY MASS INDEX: 45.32 KG/M2 | HEART RATE: 101 BPM | HEIGHT: 69 IN | TEMPERATURE: 98.6 F | DIASTOLIC BLOOD PRESSURE: 78 MMHG | SYSTOLIC BLOOD PRESSURE: 117 MMHG | RESPIRATION RATE: 16 BRPM | OXYGEN SATURATION: 97 % | WEIGHT: 306 LBS

## 2021-10-11 PROCEDURE — 99213 OFFICE O/P EST LOW 20 MIN: CPT

## 2021-10-11 NOTE — PHYSICAL EXAM
[General Appearance - Alert] : alert [Sclera] : the sclera and conjunctiva were normal [General Appearance - In No Acute Distress] : in no acute distress [PERRL With Normal Accommodation] : pupils were equal in size, round, reactive to light [Extraocular Movements] : extraocular movements were intact [Outer Ear] : the ears and nose were normal in appearance [Oropharynx] : the oropharynx was normal with no thrush [Neck Appearance] : the appearance of the neck was normal [Neck Cervical Mass (___cm)] : no neck mass was observed [Jugular Venous Distention Increased] : there was no jugular-venous distention [Thyroid Diffuse Enlargement] : the thyroid was not enlarged [Auscultation Breath Sounds / Voice Sounds] : lungs were clear to auscultation bilaterally [Heart Rate And Rhythm] : heart rate was normal and rhythm regular [Heart Sounds] : normal S1 and S2 [Heart Sounds Gallop] : no gallops [Murmurs] : no murmurs [Heart Sounds Pericardial Friction Rub] : no pericardial rub [Full Pulse] : the pedal pulses are present [Edema] : there was no peripheral edema [Bowel Sounds] : normal bowel sounds [Abdomen Soft] : soft [Abdomen Tenderness] : non-tender [Abdomen Mass (___ Cm)] : no abdominal mass palpated [Costovertebral Angle Tenderness] : no CVA tenderness [Penis Abnormality] : the penis was normal [Scrotum] : the scrotum was normal [Testes Swelling] : the testicles were not swollen [Testes Mass (___cm)] : there were no testicular masses [No Palpable Adenopathy] : no palpable adenopathy [Musculoskeletal - Swelling] : no joint swelling [Motor Tone] : muscle strength and tone were normal [Nail Clubbing] : no clubbing  or cyanosis of the fingernails [Skin Color & Pigmentation] : normal skin color and pigmentation [] : no rash [Deep Tendon Reflexes (DTR)] : deep tendon reflexes were 2+ and symmetric [Sensation] : the sensory exam was normal to light touch and pinprick [No Focal Deficits] : no focal deficits [Oriented To Time, Place, And Person] : oriented to person, place, and time [Affect] : the affect was normal

## 2021-10-12 ENCOUNTER — NON-APPOINTMENT (OUTPATIENT)
Age: 36
End: 2021-10-12

## 2021-10-12 LAB
25(OH)D3 SERPL-MCNC: 19.1 NG/ML
ALBUMIN SERPL ELPH-MCNC: 5.2 G/DL
ALP BLD-CCNC: 94 U/L
ALT SERPL-CCNC: 47 U/L
ANION GAP SERPL CALC-SCNC: 17 MMOL/L
AST SERPL-CCNC: 28 U/L
BASOPHILS # BLD AUTO: 0.03 K/UL
BASOPHILS NFR BLD AUTO: 0.4 %
BILIRUB SERPL-MCNC: 0.6 MG/DL
BUN SERPL-MCNC: 26 MG/DL
C TRACH RRNA SPEC QL NAA+PROBE: NOT DETECTED
C TRACH RRNA SPEC QL NAA+PROBE: NOT DETECTED
CALCIUM SERPL-MCNC: 10.7 MG/DL
CD3 CELLS # BLD: 2258 /UL
CD3 CELLS NFR BLD: 81 %
CD3+CD4+ CELLS # BLD: 570 /UL
CD3+CD4+ CELLS NFR BLD: 20 %
CD3+CD4+ CELLS/CD3+CD8+ CLL SPEC: 0.35 RATIO
CD3+CD8+ CELLS # SPEC: 1616 /UL
CD3+CD8+ CELLS NFR BLD: 58 %
CHLORIDE SERPL-SCNC: 86 MMOL/L
CHOLEST SERPL-MCNC: 182 MG/DL
CO2 SERPL-SCNC: 26 MMOL/L
COVID-19 NUCLEOCAPSID  GAM ANTIBODY INTERPRETATION: NEGATIVE
COVID-19 SPIKE DOMAIN ANTIBODY INTERPRETATION: POSITIVE
CREAT SERPL-MCNC: 1.6 MG/DL
EOSINOPHIL # BLD AUTO: 0.11 K/UL
EOSINOPHIL NFR BLD AUTO: 1.3 %
ESTIMATED AVERAGE GLUCOSE: 369 MG/DL
GLUCOSE SERPL-MCNC: 532 MG/DL
HBA1C MFR BLD HPLC: 14.5 %
HCT VFR BLD CALC: 46.4 %
HDLC SERPL-MCNC: 25 MG/DL
HGB BLD-MCNC: 16.2 G/DL
HIV1 RNA # SERPL NAA+PROBE: ABNORMAL
HIV1 RNA # SERPL NAA+PROBE: ABNORMAL COPIES/ML
IMM GRANULOCYTES NFR BLD AUTO: 0.5 %
LDLC SERPL CALC-MCNC: NORMAL MG/DL
LYMPHOCYTES # BLD AUTO: 3.06 K/UL
LYMPHOCYTES NFR BLD AUTO: 37.1 %
MAN DIFF?: NORMAL
MCHC RBC-ENTMCNC: 28.6 PG
MCHC RBC-ENTMCNC: 34.9 GM/DL
MCV RBC AUTO: 82 FL
MONOCYTES # BLD AUTO: 0.51 K/UL
MONOCYTES NFR BLD AUTO: 6.2 %
N GONORRHOEA RRNA SPEC QL NAA+PROBE: NOT DETECTED
N GONORRHOEA RRNA SPEC QL NAA+PROBE: NOT DETECTED
NEUTROPHILS # BLD AUTO: 4.49 K/UL
NEUTROPHILS NFR BLD AUTO: 54.5 %
NONHDLC SERPL-MCNC: 157 MG/DL
PLATELET # BLD AUTO: 315 K/UL
POTASSIUM SERPL-SCNC: 4.4 MMOL/L
PROT SERPL-MCNC: 8.8 G/DL
RBC # BLD: 5.66 M/UL
RBC # FLD: 12.5 %
SARS-COV-2 AB SERPL IA-ACNC: 244 U/ML
SARS-COV-2 AB SERPL QL IA: 0.07 INDEX
SODIUM SERPL-SCNC: 129 MMOL/L
SOURCE AMPLIFICATION: NORMAL
SOURCE ANAL: NORMAL
TRIGL SERPL-MCNC: 703 MG/DL
VIRAL LOAD INTERP: NORMAL
VIRAL LOAD LOG: ABNORMAL LG COP/ML
WBC # FLD AUTO: 8.24 K/UL

## 2021-10-13 LAB
C TRACH RRNA SPEC QL NAA+PROBE: NOT DETECTED
HBV DNA # SERPL NAA+PROBE: NOT DETECTED IU/ML
HCV RNA SERPL NAA DL=5-ACNC: NOT DETECTED IU/ML
HCV RNA SERPL NAA+PROBE-LOG IU: NOT DETECTED LOG10IU/ML
HEPB DNA PCR LOG: NOT DETECTED LOG10IU/ML
N GONORRHOEA RRNA SPEC QL NAA+PROBE: NOT DETECTED
SOURCE ORAL: NORMAL
T PALLIDUM AB SER QL IA: POSITIVE

## 2021-10-14 ENCOUNTER — NON-APPOINTMENT (OUTPATIENT)
Age: 36
End: 2021-10-14

## 2021-10-14 ENCOUNTER — APPOINTMENT (OUTPATIENT)
Dept: INFECTIOUS DISEASE | Facility: CLINIC | Age: 36
End: 2021-10-14
Payer: COMMERCIAL

## 2021-10-14 PROCEDURE — 98968 PH1 ASSMT&MGMT NQHP 21-30: CPT

## 2021-10-14 NOTE — PROGRESS NOTE ADULT - PROVIDER SPECIALTY LIST ADULT
Benefits Investigation:  Ref # [id number]    CPT Codes: 87369, 12507, 80420   Internal Medicine

## 2021-10-16 NOTE — ASSESSMENT
[Treatment Education] : treatment education [Treatment Adherence] : treatment adherence [Rx Dose / Side Effects] : Rx dose/side effects [Risk Reduction] : risk reduction [Drug Interactions / Side Effects] : drug interactions/side effects [Disclosure of Diagnosis] : disclosure of diagnosis [HIV Education] : HIV Education [Sexuality / Safer Sex] : sexuality/safer sex [Anticipatory Guidance] : anticipatory guidance [Education Materials Provided] : Eduction materials were provided [FreeTextEntry1] : 35yo man with a history of a recent presentation of advanced AIDs, but steadily improving with ARVs and OI meds\par \par Sad about his Mom's passing and this has contributed to his weight gain, but he is back to work as a \par \par Will continue the Biktarvy\par \par Poor dentition needs multiple tooth extractions- dental referral has a private dentist\par \par HIV- needs annual labs sent today, HGBA1c, lipids, will check COVID antibodies post vaccine\par \par RPR- s/p treatment x 4 doses will need repeat titer today and may need further treatment\par \par cryptosporidial diarrhea- sxs. improved with ARVs no proven effective primary therapy but if sxs. recur will try Nitazoxinide\par \par HTN- needs diet and exercise and weight loss will see nutritiionist today but will also start a BP med Losartan-hctz and repeat BP in 1 week. If not effective at reducing will cotninue current anti HTN meds\par \par needs nutritionist referral for diet and exercise weight loss program\par \par May need Pulmonary referral for sleep apnea based upon physical appearance\par \par Glucose is markedly elevated- needs referral back to ENDOCRINE ASAP for support as well as nutrition for consult\par \par RTC in 4 mo

## 2021-10-16 NOTE — HISTORY OF PRESENT ILLNESS
[Sexually Active] : The patient is sexually active [Oral] : oral [Anal] : anal [Men] : with men [FreeTextEntry1] : 35 yo man who presented with advanced AIds and multiple OIs a few years ago. He has been steadily improving since starting ARVs\par He has been taking Biktarvy without side effects noted\par \par He has a history of confirmed crysptosporidial diarrhea- sxs. 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 COVID related\par \par He needs follow up with Endocrine for his Diabetes management as well as Nutrition for weight loss counseling [Monogamous] : is not monogamous [de-identified] :  [de-identified] : mom

## 2021-10-18 ENCOUNTER — RX RENEWAL (OUTPATIENT)
Age: 36
End: 2021-10-18

## 2021-10-18 ENCOUNTER — NON-APPOINTMENT (OUTPATIENT)
Age: 36
End: 2021-10-18

## 2021-11-03 ENCOUNTER — NON-APPOINTMENT (OUTPATIENT)
Age: 36
End: 2021-11-03

## 2021-11-09 ENCOUNTER — APPOINTMENT (OUTPATIENT)
Dept: INFECTIOUS DISEASE | Facility: CLINIC | Age: 36
End: 2021-11-09
Payer: COMMERCIAL

## 2021-11-09 PROCEDURE — 98966 PH1 ASSMT&MGMT NQHP 5-10: CPT

## 2021-12-09 ENCOUNTER — RX RENEWAL (OUTPATIENT)
Age: 36
End: 2021-12-09

## 2022-01-10 ENCOUNTER — RX RENEWAL (OUTPATIENT)
Age: 37
End: 2022-01-10

## 2022-01-10 RX ORDER — 70%ISOPROPYL ALCOHOL 0.7 ML/ML
70 SWAB TOPICAL
Qty: 1 | Refills: 1 | Status: ACTIVE | COMMUNITY
Start: 2021-02-15 | End: 1900-01-01

## 2022-01-10 RX ORDER — PEN NEEDLE, DIABETIC 32GX 5/32"
32G X 4 MM NEEDLE, DISPOSABLE MISCELLANEOUS
Qty: 2 | Refills: 1 | Status: ACTIVE | COMMUNITY
Start: 2022-01-10 | End: 1900-01-01

## 2022-01-12 RX ORDER — INSULIN GLARGINE 100 [IU]/ML
100 INJECTION, SOLUTION SUBCUTANEOUS
Qty: 90 | Refills: 3 | Status: ACTIVE | COMMUNITY
Start: 2020-10-19

## 2022-01-20 NOTE — ED ADULT NURSE NOTE - NSIMPLEMENTINTERV_GEN_ALL_ED
Implemented All Universal Safety Interventions:  [place] to call system. Call bell, personal items and telephone within reach. Instruct patient to call for assistance. Room bathroom lighting operational. Non-slip footwear when patient is off stretcher. Physically safe environment: no spills, clutter or unnecessary equipment. Stretcher in lowest position, wheels locked, appropriate side rails in place.
intact

## 2022-02-04 ENCOUNTER — NON-APPOINTMENT (OUTPATIENT)
Age: 37
End: 2022-02-04

## 2022-02-08 ENCOUNTER — RX RENEWAL (OUTPATIENT)
Age: 37
End: 2022-02-08

## 2022-02-08 RX ORDER — ASPIRIN ENTERIC COATED TABLETS 81 MG 81 MG/1
81 TABLET, DELAYED RELEASE ORAL
Qty: 30 | Refills: 1 | Status: ACTIVE | COMMUNITY
Start: 2018-07-30 | End: 1900-01-01

## 2022-02-17 ENCOUNTER — APPOINTMENT (OUTPATIENT)
Dept: ENDOCRINOLOGY | Facility: CLINIC | Age: 37
End: 2022-02-17
Payer: COMMERCIAL

## 2022-02-17 VITALS
SYSTOLIC BLOOD PRESSURE: 109 MMHG | OXYGEN SATURATION: 97 % | HEART RATE: 98 BPM | DIASTOLIC BLOOD PRESSURE: 79 MMHG | WEIGHT: 306 LBS | BODY MASS INDEX: 45.32 KG/M2 | HEIGHT: 69 IN | TEMPERATURE: 97.6 F

## 2022-02-17 DIAGNOSIS — E78.5 HYPERLIPIDEMIA, UNSPECIFIED: ICD-10-CM

## 2022-02-17 DIAGNOSIS — I10 ESSENTIAL (PRIMARY) HYPERTENSION: ICD-10-CM

## 2022-02-17 DIAGNOSIS — E11.65 TYPE 2 DIABETES MELLITUS WITH HYPERGLYCEMIA: ICD-10-CM

## 2022-02-17 LAB — GLUCOSE BLDC GLUCOMTR-MCNC: 123

## 2022-02-17 PROCEDURE — 82962 GLUCOSE BLOOD TEST: CPT

## 2022-02-17 PROCEDURE — 36415 COLL VENOUS BLD VENIPUNCTURE: CPT

## 2022-02-17 PROCEDURE — 99205 OFFICE O/P NEW HI 60 MIN: CPT | Mod: 25

## 2022-02-17 NOTE — HISTORY OF PRESENT ILLNESS
[FreeTextEntry1] : HISTORY OF PRESENT ILLNESS. \par \par Mr. CLEVELAND was diagnosed with Diabetes Mellitus Type 2 in 2019. He reports history HTN, dyslipidemia, HIV and denies CAD,  known complications of retinopathy, nephropathy, or neuropathy. Never on OHG, on insulin from the diagnosis. He is presently on Semglee 40 un HS, Novolog 15 un ac meals. He reports a history of non-adherence to his medications, but it has improved recently. He's also on Losartan, Biktarvy\par He's wearing Javan 14, but is not scanning it.\par Checks his FS occasionally- reports fbs- 140's, ppg- 120's- 150's\par \par Fingerstick glucose in the office today is 123 mg/dL 2 hours after eating. \par Diet: not following ADA\par Exercise: walking. Worked as a , now mostly deliveries.\par Non-smoker, no EtOH. no history of pancreatitis\par \par Lab review: a1c- 14.5\par \par Last dilated eye - none\par Last podiatry visit  - none\par Last cardiology evaluation - none\par Last stress test - none\par Last 2-D Echo -none\par Last nephrology evaluation - none\par Last neurology evaluation- none\par

## 2022-02-17 NOTE — ASSESSMENT
[Diabetes Foot Care] : diabetes foot care [Long Term Vascular Complications] : long term vascular complications of diabetes [Carbohydrate Consistent Diet] : carbohydrate consistent diet [Importance of Diet and Exercise] : importance of diet and exercise to improve glycemic control, achieve weight loss and improve cardiovascular health [Exercise/Effect on Glucose] : exercise/effect on glucose [Hypoglycemia Management] : hypoglycemia management [Glucagon Use] : glucagon use [Ketone Testing] : ketone testing [Action and use of Insulin] : action and use of short and long-acting insulin [Self Monitoring of Blood Glucose] : self monitoring of blood glucose [Insulin Self-Administration] : insulin self-administration [Injection Technique, Storage, Sharps Disposal] : injection technique, storage, and sharps disposal [Sick-Day Management] : sick-day management [Retinopathy Screening] : Patient was referred to ophthalmology for retinopathy screening [Diabetic Medications] : Risks and benefits of diabetic medications were discussed [FreeTextEntry1] : T2DM, uncontrolled\par Current approaches to diabetes management are discussed with the patient. \par Target ranges for blood sugar, blood pressure and cholesterol reviewed, and risk reduction strategies verified. \par Hypoglycemia precautions reviewed with the patient. \par Suggested extensive diabetes education program, including nutritional and diabetes teaching and evaluation. \par Proper dietary restrictions and exercise routines discussed. \par Glucometer/SMBG and log book charting discussed.\par - resume Javan 14. [email or web address] connected for sharing\par - labs today\par - continue Semglee 40 un HS, Novolog 15 un tid ac meals for now,untile we get more information\par - check pancreatic reserve\par - if able, and no c/indications, will add OHG and possibly GLP1RA\par - monitor lipids, urine microalbumin, blood pressure\par RTC 2- 3 weeks for sensor download and CDE evaluation\par

## 2022-02-17 NOTE — CONSULT LETTER
[Dear  ___] : Dear  [unfilled], [Sincerely,] : Sincerely, [FreeTextEntry1] : Thank you for referring  Mr. BRI CLEVELAND to me for evaluation and treatment. Please, see attached consultation note. As always, if there are specific questions you would like to discuss, please feel free to contact me.\par Thank you for the courtesy of this evaluation.\par  [FreeTextEntry3] : Katerina Cordova MD, FACE, ECNU\par

## 2022-03-03 ENCOUNTER — RX RENEWAL (OUTPATIENT)
Age: 37
End: 2022-03-03

## 2022-03-06 ENCOUNTER — RX RENEWAL (OUTPATIENT)
Age: 37
End: 2022-03-06

## 2022-03-06 RX ORDER — ISOPROPYL ALCOHOL 70 ML/100ML
70 SWAB TOPICAL
Qty: 1 | Refills: 1 | Status: ACTIVE | COMMUNITY
Start: 2021-06-18 | End: 1900-01-01

## 2022-03-18 LAB
25(OH)D3 SERPL-MCNC: 25.2 NG/ML
ALBUMIN SERPL ELPH-MCNC: 4.9 G/DL
ALP BLD-CCNC: 48 U/L
ALT SERPL-CCNC: 27 U/L
ANION GAP SERPL CALC-SCNC: 15 MMOL/L
AST SERPL-CCNC: 27 U/L
BILIRUB SERPL-MCNC: 0.4 MG/DL
BUN SERPL-MCNC: 20 MG/DL
C PEPTIDE SERPL-MCNC: 7.5 NG/ML
CALCIUM SERPL-MCNC: 9.6 MG/DL
CHLORIDE SERPL-SCNC: 98 MMOL/L
CHOLEST SERPL-MCNC: 169 MG/DL
CO2 SERPL-SCNC: 26 MMOL/L
CREAT SERPL-MCNC: 1.63 MG/DL
CREAT SPEC-SCNC: 125 MG/DL
ESTIMATED AVERAGE GLUCOSE: 243 MG/DL
FOLATE SERPL-MCNC: 13.3 NG/ML
FRUCTOSAMINE SERPL-MCNC: 390 UMOL/L
GAD65 AB SER-MCNC: 0 NMOL/L
GLUCOSE SERPL-MCNC: 146 MG/DL
HBA1C MFR BLD HPLC: 10.1 %
HDLC SERPL-MCNC: 27 MG/DL
INSULIN SERPL-MCNC: 101 UU/ML
LDLC SERPL CALC-MCNC: 87 MG/DL
MICROALBUMIN 24H UR DL<=1MG/L-MCNC: <1.2 MG/DL
MICROALBUMIN/CREAT 24H UR-RTO: NORMAL MG/G
NONHDLC SERPL-MCNC: 143 MG/DL
PANC ISLET CELL AB SER QL: NORMAL
POTASSIUM SERPL-SCNC: 4 MMOL/L
PROT SERPL-MCNC: 8.1 G/DL
SODIUM SERPL-SCNC: 138 MMOL/L
T4 FREE SERPL-MCNC: 1 NG/DL
TRIGL SERPL-MCNC: 277 MG/DL
TSH SERPL-ACNC: 2.14 UIU/ML
VIT B12 SERPL-MCNC: 395 PG/ML
ZINC TRANSPORTER 8 AB: <15 U/ML

## 2022-03-24 RX ORDER — SITAGLIPTIN AND METFORMIN HYDROCHLORIDE 100; 1000 MG/1; MG/1
100-1000 TABLET, FILM COATED, EXTENDED RELEASE ORAL DAILY
Qty: 90 | Refills: 2 | Status: ACTIVE | COMMUNITY
Start: 2022-03-18

## 2022-03-25 NOTE — ED PROVIDER NOTE - MDM ORDERS SUBMITTED SELECTION
What Type Of Note Output Would You Prefer (Optional)?: Standard Output
How Severe Is Your Skin Lesion?: mild
Has Your Skin Lesion Been Treated?: not been treated
Is This A New Presentation, Or A Follow-Up?: Skin Lesion
Labs/Imaging Studies

## 2022-03-26 ENCOUNTER — RX RENEWAL (OUTPATIENT)
Age: 37
End: 2022-03-26

## 2022-03-30 ENCOUNTER — RX RENEWAL (OUTPATIENT)
Age: 37
End: 2022-03-30

## 2022-04-02 ENCOUNTER — RX RENEWAL (OUTPATIENT)
Age: 37
End: 2022-04-02

## 2022-04-02 RX ORDER — ASPIRIN 81 MG/1
81 TABLET, COATED ORAL
Qty: 30 | Refills: 0 | Status: ACTIVE | COMMUNITY
Start: 2018-09-13 | End: 1900-01-01

## 2022-04-02 RX ORDER — FLASH GLUCOSE SENSOR
KIT MISCELLANEOUS
Qty: 2 | Refills: 0 | Status: ACTIVE | COMMUNITY
Start: 2021-02-15 | End: 1900-01-01

## 2022-04-28 ENCOUNTER — RX RENEWAL (OUTPATIENT)
Age: 37
End: 2022-04-28

## 2022-04-28 RX ORDER — ISOPROPYL ALCOHOL 70 ML/100ML
SWAB TOPICAL
Qty: 1 | Refills: 0 | Status: ACTIVE | COMMUNITY
Start: 2022-03-30 | End: 1900-01-01

## 2022-04-28 RX ORDER — BLOOD SUGAR DIAGNOSTIC
STRIP MISCELLANEOUS
Qty: 1 | Refills: 0 | Status: ACTIVE | COMMUNITY
Start: 2021-07-22 | End: 1900-01-01

## 2022-04-28 RX ORDER — INSULIN ASPART 100 [IU]/ML
100 INJECTION, SOLUTION INTRAVENOUS; SUBCUTANEOUS 3 TIMES DAILY
Qty: 15 | Refills: 0 | Status: ACTIVE | COMMUNITY
Start: 2021-02-14 | End: 1900-01-01

## 2022-05-02 ENCOUNTER — APPOINTMENT (OUTPATIENT)
Dept: INFECTIOUS DISEASE | Facility: CLINIC | Age: 37
End: 2022-05-02

## 2022-05-03 ENCOUNTER — NON-APPOINTMENT (OUTPATIENT)
Age: 37
End: 2022-05-03

## 2022-05-04 ENCOUNTER — APPOINTMENT (OUTPATIENT)
Dept: ENDOCRINOLOGY | Facility: CLINIC | Age: 37
End: 2022-05-04

## 2022-05-06 ENCOUNTER — NON-APPOINTMENT (OUTPATIENT)
Age: 37
End: 2022-05-06

## 2022-05-20 ENCOUNTER — NON-APPOINTMENT (OUTPATIENT)
Age: 37
End: 2022-05-20

## 2022-05-20 RX ORDER — FLASH GLUCOSE SENSOR
KIT MISCELLANEOUS
Qty: 7 | Refills: 2 | Status: DISCONTINUED | COMMUNITY
Start: 2020-11-06 | End: 2022-05-20

## 2022-05-20 RX ORDER — DOXYCYCLINE HYCLATE 100 MG/1
100 TABLET ORAL TWICE DAILY
Qty: 56 | Refills: 0 | Status: DISCONTINUED | COMMUNITY
Start: 2021-07-06 | End: 2022-05-20

## 2022-05-20 RX ORDER — METFORMIN HYDROCHLORIDE 1000 MG/1
1000 TABLET, EXTENDED RELEASE ORAL
Qty: 30 | Refills: 2 | Status: DISCONTINUED | COMMUNITY
Start: 2020-09-29 | End: 2022-05-20

## 2022-05-24 ENCOUNTER — FORM ENCOUNTER (OUTPATIENT)
Age: 37
End: 2022-05-24

## 2022-05-25 ENCOUNTER — APPOINTMENT (OUTPATIENT)
Dept: INFECTIOUS DISEASE | Facility: CLINIC | Age: 37
End: 2022-05-25
Payer: COMMERCIAL

## 2022-05-25 VITALS
DIASTOLIC BLOOD PRESSURE: 88 MMHG | RESPIRATION RATE: 16 BRPM | HEART RATE: 86 BPM | BODY MASS INDEX: 46.65 KG/M2 | SYSTOLIC BLOOD PRESSURE: 136 MMHG | TEMPERATURE: 98.7 F | HEIGHT: 69 IN | OXYGEN SATURATION: 97 % | WEIGHT: 315 LBS

## 2022-05-25 DIAGNOSIS — E66.01 MORBID (SEVERE) OBESITY DUE TO EXCESS CALORIES: ICD-10-CM

## 2022-05-25 DIAGNOSIS — B20 HUMAN IMMUNODEFICIENCY VIRUS [HIV] DISEASE: ICD-10-CM

## 2022-05-25 DIAGNOSIS — A07.2 HUMAN IMMUNODEFICIENCY VIRUS [HIV] DISEASE: ICD-10-CM

## 2022-05-25 DIAGNOSIS — Z86.79 PERSONAL HISTORY OF OTHER DISEASES OF THE CIRCULATORY SYSTEM: ICD-10-CM

## 2022-05-25 DIAGNOSIS — E11.9 TYPE 2 DIABETES MELLITUS W/OUT COMPLICATIONS: ICD-10-CM

## 2022-05-25 PROCEDURE — 99212 OFFICE O/P EST SF 10 MIN: CPT

## 2022-05-27 NOTE — ASSESSMENT
[Treatment Education] : treatment education [Treatment Adherence] : treatment adherence [Rx Dose / Side Effects] : Rx dose/side effects [Risk Reduction] : risk reduction [Drug Interactions / Side Effects] : drug interactions/side effects [Disclosure of Diagnosis] : disclosure of diagnosis [HIV Education] : HIV Education [Sexuality / Safer Sex] : sexuality/safer sex [Anticipatory Guidance] : anticipatory guidance [Education Materials Provided] : Eduction materials were provided [FreeTextEntry1] : Assessment:\par The patient is a 36 year old male w/ PMHx of HIV on Biktarvy who presents today for outpatient follow-up. \par He reports being compliant w/ his medication without significant side effects. He has a history of cryptosporidiosis diarrhea that improved w/ ART therapy. He has a history of syphilis s/p treatment. He currently works as a  and reports increased weight gain. He denies fever, chills, chest pain, dizziness, palpitations, abdominal pain, N/V/D/C or urinary changes. \par \par Plan:\par # HIV disease\par - c/w PO biktarvy\par - c/w ASA \par - check UA and cystatin-C \par - check CBC, CMP, T-cell subset, HIV viral load, syphilis profile, A1c, lipid profile\par -  on diet and exercise \par - nutritionist and endocrine follow-up \par - f/u w/ private dentist \par \par RTC in 4-6 months\par \par Patient seen w/ Dr. Harrison MD \par \par \par

## 2022-05-27 NOTE — END OF VISIT
[] : Fellow [FreeTextEntry3] : Mr guzman has well controlled HIV. HIs major health issue is poorly controlled diabetes, obesity and lack of exercise. We discussed nutrition, exercise and f/u with endocrinology.  His elevated creatine/low eGRF is likely due to large muscle mass and/or effect of bictegravir on creatine excretion. His urinary albumin was negative when last examined. He should have eGFR determined by cystatin-c.

## 2022-05-27 NOTE — HISTORY OF PRESENT ILLNESS
[Oral] : oral [Anal] : anal [Men] : with men [FreeTextEntry1] : The patient is a 36 year old male w/ PMHx of HIV on Biktarvy who presents today for outpatient follow-up. \par He reports being compliant w/ his medication without significant side effects. He has a history of cryptosporidiosis diarrhea that improved w/ ART therapy. He has a history of syphilis s/p treatment. He currently works as a  and reports increased weight gain. He denies fever, chills, chest pain, dizziness, palpitations, abdominal pain, N/V/D/C or urinary changes. He denies recent travel and sick contacts. He denies currently being sexually active. He reports his mother passing away in December. Patient currently lives in [place] and visits [place] to see his aunt. He received his initial COVID-19 vaccine series but otherwise did not receive the booster. \par He reports a 6-7 lb weight gain. \par \par Recent labs \par 2/17/22 , Cr. 1.63, A1c 10.1\par \par 10/11/21 COVID-19 spike Ab +, COVID-19 nAb -, glucose 532, BUN 26, Cr. 1.60, ALT 47\par CD4 570, HIV viral load ND\par GC x 3 negative\par HCV RNA -, HBV DNA -, RPR +, RPR titer 1:8, syphilis TPA +  [Sexually Active] : The patient is not sexually active [Monogamous] : is not monogamous [de-identified] :  [de-identified] : mom

## 2022-06-14 ENCOUNTER — APPOINTMENT (OUTPATIENT)
Dept: ENDOCRINOLOGY | Facility: CLINIC | Age: 37
End: 2022-06-14

## 2022-06-30 ENCOUNTER — RX RENEWAL (OUTPATIENT)
Age: 37
End: 2022-06-30

## 2022-06-30 ENCOUNTER — NON-APPOINTMENT (OUTPATIENT)
Age: 37
End: 2022-06-30

## 2022-07-26 ENCOUNTER — RX RENEWAL (OUTPATIENT)
Age: 37
End: 2022-07-26

## 2022-08-25 ENCOUNTER — RX RENEWAL (OUTPATIENT)
Age: 37
End: 2022-08-25

## 2022-09-23 ENCOUNTER — RX RENEWAL (OUTPATIENT)
Age: 37
End: 2022-09-23

## 2022-09-23 RX ORDER — PEN NEEDLE, DIABETIC 29 G X1/2"
32G X 4 MM NEEDLE, DISPOSABLE MISCELLANEOUS
Qty: 1 | Refills: 0 | Status: ACTIVE | COMMUNITY
Start: 2022-03-03 | End: 1900-01-01

## 2022-10-14 ENCOUNTER — NON-APPOINTMENT (OUTPATIENT)
Age: 37
End: 2022-10-14

## 2022-10-18 ENCOUNTER — APPOINTMENT (OUTPATIENT)
Dept: INFECTIOUS DISEASE | Facility: CLINIC | Age: 37
End: 2022-10-18

## 2022-10-25 ENCOUNTER — RX RENEWAL (OUTPATIENT)
Age: 37
End: 2022-10-25

## 2022-11-23 ENCOUNTER — RX RENEWAL (OUTPATIENT)
Age: 37
End: 2022-11-23

## 2022-12-24 ENCOUNTER — RX RENEWAL (OUTPATIENT)
Age: 37
End: 2022-12-24

## 2022-12-27 ENCOUNTER — RX RENEWAL (OUTPATIENT)
Age: 37
End: 2022-12-27

## 2023-01-09 NOTE — ED ADULT TRIAGE NOTE - ADDITIONAL COMPLAINTS
"Patient: Romy Langley    YOB: 2003    Date: 01/13/2023    Primary Care Provider: Nadya Hidalgo MD    Chief Complaint   Patient presents with   • Mass     umbilical       SUBJECTIVE:    History of present illness:  I saw the patient in the office today as a consultation for evaluation and treatment of umbilical hernia. Went to ED at [facility] 1/9/23 for pain in umbilicus since 7 days ago when she lifted a heavy bag of dog food and felt a \"tearing\" around her umbilicus.  She states that she has had constant pain since then but it is worse with any kind of movement. She reports normal bowel movements.  She denies associated symptoms including vomiting, fever, urinary symptoms, and additional symptoms or complaints at this time.  She has not been taking any medication for the pain. CT Abdomen Pelvis Without Contrast was done on 1/9/23 which showed  No significant abnormality of the lower abdominal wall is visualized.  Tiny fat-containing umbilical hernia.  No hydronephrosis or nephrolithiasis.     The following portions of the patient's history were reviewed and updated as appropriate: allergies, current medications, past family history, past medical history, past social history, past surgical history and problem list.    Review of Systems   Constitutional: Negative for chills, fever and unexpected weight change.   HENT: Negative for hearing loss, trouble swallowing and voice change.    Eyes: Negative for visual disturbance.   Respiratory: Negative for apnea, cough, chest tightness, shortness of breath and wheezing.    Cardiovascular: Negative for chest pain, palpitations and leg swelling.   Gastrointestinal: Positive for abdominal pain. Negative for abdominal distention, anal bleeding, blood in stool, constipation, diarrhea, nausea, rectal pain and vomiting.   Endocrine: Negative for cold intolerance and heat intolerance.   Genitourinary: Negative for difficulty urinating, " dysuria and flank pain.   Musculoskeletal: Negative for back pain and gait problem.   Skin: Negative for color change, rash and wound.   Neurological: Negative for dizziness, syncope, speech difficulty, weakness, light-headedness, numbness and headaches.   Hematological: Negative for adenopathy. Does not bruise/bleed easily.   Psychiatric/Behavioral: Negative for confusion. The patient is not nervous/anxious.        History:  Past Medical History:   Diagnosis Date   • Thyroid activity decreased    • Urinary tract infection        Past Surgical History:   Procedure Laterality Date   •  SECTION N/A 2022    Procedure:  SECTION PRIMARY;  Surgeon: Lukas Rodriguez MD;  Location: [facility];  Service: Obstetrics/Gynecology;  Laterality: N/A;   • FOOT/TOE TENDON REPAIR Left 2020    Procedure: RECONSTRUCTION PT TENDON LEFT;  Surgeon: Uziel Garcia DPM;  Location: [facility];  Service: Podiatry   • NO PAST SURGERIES     • TARSAL TUNNEL RELEASE Left 2020    Procedure: EXCISION TARSAL BONE LEFT;  Surgeon: Uziel Garcia DPM;  Location: [facility];  Service: Podiatry   • WISDOM TOOTH EXTRACTION         Family History   Problem Relation Age of Onset   • Hypertension Mother    • Diabetes Father    • Heart attack Maternal Grandfather    • Diabetes Maternal Grandfather        Social History     Tobacco Use   • Smoking status: Never   • Smokeless tobacco: Never   Vaping Use   • Vaping Use: Never used   Substance Use Topics   • Alcohol use: No   • Drug use: No       Allergies:  No Known Allergies    Medications:    Current Outpatient Medications:   •  ketorolac (TORADOL) 10 MG tablet, Take 1 tablet by mouth Every 6 (Six) Hours As Needed for Moderate Pain., Disp: 15 tablet, Rfl: 0  •  ondansetron (ZOFRAN) 8 MG tablet, TAKE 1 TABLET BY MOUTH EVERY 8 HOURS FOR 2 DAYS, Disp: , Rfl:     OBJECTIVE:    Vital Signs:   Vitals:    23 1056   BP: 120/68   Pulse: 71   Temp: 97.5 °F (36.4  "°C)   TempSrc: Temporal   SpO2: 100%   Weight: 70.3 kg (155 lb)   Height: 165.1 cm (65\")       Physical Exam:   General Appearance:    Alert, cooperative, in no acute distress   Head:    Normocephalic, without obvious abnormality, atraumatic   Eyes:            Lids and lashes normal, conjunctivae and sclerae normal, no   icterus, no pallor, corneas clear, PERRLA   Ears:    Ears appear intact with no abnormalities noted   Throat:   No oral lesions, no thrush, oral mucosa moist   Neck:   No adenopathy, supple, trachea midline, no thyromegaly, no   carotid bruit, no JVD   Lungs:     Clear to auscultation,respirations regular, even and                  unlabored    Heart:    Regular rhythm and normal rate, normal S1 and S2, no            murmur   Abdomen:     no masses, no organomegaly, soft non-tender, non-distended, no guarding, there is evidence of a small umbilical hernia   Extremities:   Moves all extremities well, no edema, no cyanosis, no             redness   Pulses:   Pulses palpable and equal bilaterally   Skin:   No bleeding, bruising or rash   Lymph nodes:   No palpable adenopathy   Neurologic:   Cranial nerves 2 - 12 grossly intact, sensation intact        Results Review:   I reviewed the patient's new clinical results.  I reviewed the patient's new imaging results and agree with the interpretation.    Review of Systems was reviewed and confirmed as accurate as documented by the MA.    ASSESSMENT/PLAN:    1. Umbilical hernia without obstruction and without gangrene        I had a detailed and extensive discussion with the patient in the office and they understand that they need to undergo hernia repair with possible mesh.  Full risks and benefits of operative versus nonoperative intervention were discussed with the patient and these included things such as nonresolution of symptoms and possible worsening of symptoms without surgical intervention versus infection, bleeding, possible recurrent hernia, " possible postoperative neuralgia from nerve damage or involvement with scar tissue, etc.  The patient understands, agrees, and had no questions for me at the end of the office visit.     I discussed the patients findings and my recommendations with patient.    Electronically signed by Mery Cadet DO  01/13/23             Additional Complaints

## 2023-01-24 ENCOUNTER — RX RENEWAL (OUTPATIENT)
Age: 38
End: 2023-01-24

## 2023-02-10 NOTE — DIETITIAN INITIAL EVALUATION ADULT. - ETIOLOGY
BS 76   excessive energy intake, inadequate physical activity lack of exposure to nutrition related topics

## 2023-03-13 ENCOUNTER — RX RENEWAL (OUTPATIENT)
Age: 38
End: 2023-03-13

## 2023-03-14 ENCOUNTER — RX RENEWAL (OUTPATIENT)
Age: 38
End: 2023-03-14

## 2023-03-15 ENCOUNTER — RX RENEWAL (OUTPATIENT)
Age: 38
End: 2023-03-15

## 2023-03-15 RX ORDER — ISOPROPYL ALCOHOL 0.75 G/1
SWAB TOPICAL
Qty: 1 | Refills: 2 | Status: ACTIVE | COMMUNITY
Start: 2022-08-25 | End: 1900-01-01

## 2023-03-15 RX ORDER — BLOOD-GLUCOSE CONTROL, LOW
EACH MISCELLANEOUS
Qty: 1 | Refills: 2 | Status: ACTIVE | COMMUNITY
Start: 2021-12-09 | End: 1900-01-01

## 2023-03-24 ENCOUNTER — APPOINTMENT (OUTPATIENT)
Dept: INFECTIOUS DISEASE | Facility: CLINIC | Age: 38
End: 2023-03-24
Payer: COMMERCIAL

## 2023-03-24 ENCOUNTER — LABORATORY RESULT (OUTPATIENT)
Age: 38
End: 2023-03-24

## 2023-03-24 ENCOUNTER — NON-APPOINTMENT (OUTPATIENT)
Age: 38
End: 2023-03-24

## 2023-03-24 VITALS — WEIGHT: 315 LBS | BODY MASS INDEX: 47.26 KG/M2

## 2023-03-24 VITALS
TEMPERATURE: 97.7 F | DIASTOLIC BLOOD PRESSURE: 99 MMHG | OXYGEN SATURATION: 97 % | HEIGHT: 69 IN | SYSTOLIC BLOOD PRESSURE: 141 MMHG | HEART RATE: 94 BPM

## 2023-03-24 LAB
HCT VFR BLD CALC: 41.6 %
HGB BLD-MCNC: 14 G/DL
MCHC RBC-ENTMCNC: 28.3 PG
MCHC RBC-ENTMCNC: 33.7 GM/DL
MCV RBC AUTO: 84.2 FL
PLATELET # BLD AUTO: 281 K/UL
RBC # BLD: 4.94 M/UL
RBC # FLD: 13.2 %
WBC # FLD AUTO: 9.03 K/UL

## 2023-03-24 PROCEDURE — 99214 OFFICE O/P EST MOD 30 MIN: CPT

## 2023-03-26 LAB
ALBUMIN SERPL ELPH-MCNC: 4.9 G/DL
ALP BLD-CCNC: 48 U/L
ALT SERPL-CCNC: 30 U/L
ANION GAP SERPL CALC-SCNC: 12 MMOL/L
AST SERPL-CCNC: 24 U/L
BILIRUB SERPL-MCNC: 0.5 MG/DL
BUN SERPL-MCNC: 12 MG/DL
C TRACH RRNA SPEC QL NAA+PROBE: NOT DETECTED
CALCIUM SERPL-MCNC: 9.9 MG/DL
CD3 CELLS # BLD: 2805 CELLS/UL
CD3 CELLS NFR BLD: 80 %
CD3+CD4+ CELLS # BLD: 696 CELLS/UL
CD3+CD4+ CELLS NFR BLD: 20 %
CD3+CD4+ CELLS/CD3+CD8+ CLL SPEC: 0.34 RATIO
CD3+CD8+ CELLS # SPEC: 2052 CELLS/UL
CD3+CD8+ CELLS NFR BLD: 58 %
CHLORIDE SERPL-SCNC: 101 MMOL/L
CHOLEST SERPL-MCNC: 153 MG/DL
CO2 SERPL-SCNC: 28 MMOL/L
CREAT SERPL-MCNC: 1.11 MG/DL
EGFR: 87 ML/MIN/1.73M2
ESTIMATED AVERAGE GLUCOSE: 171 MG/DL
GLUCOSE SERPL-MCNC: 126 MG/DL
HBA1C MFR BLD HPLC: 7.6 %
HCV RNA SERPL NAA+PROBE-LOG IU: NOT DETECTED LOGIU/ML
HDLC SERPL-MCNC: 33 MG/DL
HEPC RNA INTERP: NOT DETECTED
HIV1 RNA # SERPL NAA+PROBE: ABNORMAL
HIV1 RNA # SERPL NAA+PROBE: ABNORMAL COPIES/ML
LDLC SERPL CALC-MCNC: 88 MG/DL
N GONORRHOEA RRNA SPEC QL NAA+PROBE: NOT DETECTED
NONHDLC SERPL-MCNC: 120 MG/DL
POTASSIUM SERPL-SCNC: 4.2 MMOL/L
PROT SERPL-MCNC: 7.8 G/DL
SODIUM SERPL-SCNC: 141 MMOL/L
SOURCE AMPLIFICATION: NORMAL
SOURCE ANAL: NORMAL
SOURCE ORAL: NORMAL
T PALLIDUM AB SER QL IA: POSITIVE
TRIGL SERPL-MCNC: 159 MG/DL
VIRAL LOAD INTERP: NORMAL
VIRAL LOAD LOG: ABNORMAL LG COP/ML

## 2023-03-30 LAB
M TB IFN-G BLD-IMP: NEGATIVE
QUANTIFERON TB PLUS MITOGEN MINUS NIL: >10 IU/ML
QUANTIFERON TB PLUS NIL: 0.04 IU/ML
QUANTIFERON TB PLUS TB1 MINUS NIL: 0.03 IU/ML
QUANTIFERON TB PLUS TB2 MINUS NIL: 0.05 IU/ML

## 2023-03-30 NOTE — HISTORY OF PRESENT ILLNESS
[Sexually Active] : The patient is sexually active [Oral] : oral [Anal] : anal [Men] : with men [FreeTextEntry1] : 36 yo man who presented with advanced AIds and multiple OIs a few years ago. He has been steadily improving since starting ARVs\par He has been taking Biktarvy without side effects noted\par \par He has a history of confirmed crysptosporidial diarrhea- sxs. 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 COVID related\par \par He needs follow up with Endocrine for his Diabetes management as well as Nutrition for weight loss counseling [Monogamous] : is not monogamous [de-identified] :

## 2023-03-30 NOTE — ASSESSMENT
[Treatment Education] : treatment education [Treatment Adherence] : treatment adherence [Rx Dose / Side Effects] : Rx dose/side effects [Risk Reduction] : risk reduction [Drug Interactions / Side Effects] : drug interactions/side effects [Disclosure of Diagnosis] : disclosure of diagnosis [HIV Education] : HIV Education [Sexuality / Safer Sex] : sexuality/safer sex [Anticipatory Guidance] : anticipatory guidance [Education Materials Provided] : Eduction materials were provided [FreeTextEntry1] : 36yo man with a history of a recent presentation of advanced AIDs, but steadily improving with ARVs and OI meds\par \par Sad about his Mom's passing and this has contributed to his weight gain, but he is back to work as a \par \par Will continue the Biktarvy\par \par Poor dentition needs multiple tooth extractions- dental referral has a private dentist\par \par HIV- needs annual labs sent today, HGBA1c, lipids, will check COVID antibodies post vaccine\par \par RPR- s/p treatment x 4 doses will need repeat titer today and may need further treatment\par \par cryptosporidial diarrhea- sxs. improved with ARVs no proven effective primary therapy but if sxs. recur will try Nitazoxinide\par \par HTN- needs diet and exercise and weight loss will see nutritiionist today but will also start a BP med Losartan-hctz and repeat BP in 1 week. If not effective at reducing will cotninue current anti HTN meds\par \par needs nutritionist referral for diet and exercise weight loss program\par \par routine HIV labs CMP/CB\par HIV VL Tcells\par \par May need Pulmonary referral for sleep apnea based upon physical appearance\par \par Glucose is markedly elevated- needs referral back to ENDOCRINE ASAP for support as well as nutrition for consult\par \par RTC in 4 mo

## 2023-04-12 ENCOUNTER — RX RENEWAL (OUTPATIENT)
Age: 38
End: 2023-04-12

## 2023-04-14 ENCOUNTER — RX RENEWAL (OUTPATIENT)
Age: 38
End: 2023-04-14

## 2023-05-18 ENCOUNTER — RX RENEWAL (OUTPATIENT)
Age: 38
End: 2023-05-18

## 2023-05-22 ENCOUNTER — RX RENEWAL (OUTPATIENT)
Age: 38
End: 2023-05-22

## 2023-06-26 ENCOUNTER — RX RENEWAL (OUTPATIENT)
Age: 38
End: 2023-06-26

## 2023-07-18 ENCOUNTER — NON-APPOINTMENT (OUTPATIENT)
Age: 38
End: 2023-07-18

## 2023-07-19 ENCOUNTER — NON-APPOINTMENT (OUTPATIENT)
Age: 38
End: 2023-07-19

## 2023-08-02 ENCOUNTER — NON-APPOINTMENT (OUTPATIENT)
Age: 38
End: 2023-08-02

## 2023-08-04 ENCOUNTER — NON-APPOINTMENT (OUTPATIENT)
Age: 38
End: 2023-08-04

## 2023-08-11 ENCOUNTER — APPOINTMENT (OUTPATIENT)
Dept: INFECTIOUS DISEASE | Facility: CLINIC | Age: 38
End: 2023-08-11

## 2023-08-21 ENCOUNTER — NON-APPOINTMENT (OUTPATIENT)
Age: 38
End: 2023-08-21

## 2023-09-17 ENCOUNTER — NON-APPOINTMENT (OUTPATIENT)
Age: 38
End: 2023-09-17

## 2023-09-18 ENCOUNTER — NON-APPOINTMENT (OUTPATIENT)
Age: 38
End: 2023-09-18

## 2023-10-24 ENCOUNTER — RX RENEWAL (OUTPATIENT)
Age: 38
End: 2023-10-24

## 2023-10-24 RX ORDER — METFORMIN HYDROCHLORIDE 1000 MG/1
1000 TABLET, FILM COATED, EXTENDED RELEASE ORAL
Qty: 1 | Refills: 1 | Status: ACTIVE | COMMUNITY
Start: 2023-10-24 | End: 1900-01-01

## 2023-10-24 RX ORDER — ERGOCALCIFEROL 1.25 MG/1
1 CAPSULE ORAL
Qty: 0 | Refills: 0 | DISCHARGE

## 2023-10-24 RX ORDER — ASPIRIN/CALCIUM CARB/MAGNESIUM 324 MG
1 TABLET ORAL
Qty: 0 | Refills: 0 | DISCHARGE

## 2023-10-24 RX ORDER — BICTEGRAVIR SODIUM, EMTRICITABINE, AND TENOFOVIR ALAFENAMIDE FUMARATE 30; 120; 15 MG/1; MG/1; MG/1
1 TABLET ORAL
Qty: 0 | Refills: 0 | DISCHARGE

## 2023-10-24 RX ORDER — OLMESARTAN MEDOXOMIL-HYDROCHLOROTHIAZIDE 25; 40 MG/1; MG/1
1 TABLET, FILM COATED ORAL
Qty: 0 | Refills: 0 | DISCHARGE

## 2023-10-26 ENCOUNTER — NON-APPOINTMENT (OUTPATIENT)
Age: 38
End: 2023-10-26

## 2023-10-27 ENCOUNTER — NON-APPOINTMENT (OUTPATIENT)
Age: 38
End: 2023-10-27

## 2023-11-02 ENCOUNTER — NON-APPOINTMENT (OUTPATIENT)
Age: 38
End: 2023-11-02

## 2023-11-03 ENCOUNTER — NON-APPOINTMENT (OUTPATIENT)
Age: 38
End: 2023-11-03

## 2023-11-06 ENCOUNTER — NON-APPOINTMENT (OUTPATIENT)
Age: 38
End: 2023-11-06

## 2023-11-08 ENCOUNTER — NON-APPOINTMENT (OUTPATIENT)
Age: 38
End: 2023-11-08

## 2023-11-15 ENCOUNTER — LABORATORY RESULT (OUTPATIENT)
Age: 38
End: 2023-11-15

## 2023-11-15 ENCOUNTER — APPOINTMENT (OUTPATIENT)
Dept: INFECTIOUS DISEASE | Facility: CLINIC | Age: 38
End: 2023-11-15
Payer: COMMERCIAL

## 2023-11-15 VITALS
RESPIRATION RATE: 16 BRPM | HEIGHT: 69 IN | TEMPERATURE: 97.8 F | SYSTOLIC BLOOD PRESSURE: 135 MMHG | OXYGEN SATURATION: 97 % | HEART RATE: 84 BPM | BODY MASS INDEX: 44.88 KG/M2 | DIASTOLIC BLOOD PRESSURE: 91 MMHG | WEIGHT: 303 LBS

## 2023-11-15 DIAGNOSIS — Z01.00 ENCOUNTER FOR EXAMINATION OF EYES AND VISION W/OUT ABNORMAL FINDINGS: ICD-10-CM

## 2023-11-15 DIAGNOSIS — Z92.89 PERSONAL HISTORY OF OTHER MEDICAL TREATMENT: ICD-10-CM

## 2023-11-15 DIAGNOSIS — B20 HUMAN IMMUNODEFICIENCY VIRUS [HIV] DISEASE: ICD-10-CM

## 2023-11-15 PROBLEM — I10 ESSENTIAL (PRIMARY) HYPERTENSION: Chronic | Status: ACTIVE | Noted: 2020-09-23

## 2023-11-15 PROCEDURE — 99214 OFFICE O/P EST MOD 30 MIN: CPT

## 2023-11-16 LAB
ALBUMIN SERPL ELPH-MCNC: 5.2 G/DL
ALP BLD-CCNC: 68 U/L
ALT SERPL-CCNC: 41 U/L
ANION GAP SERPL CALC-SCNC: 13 MMOL/L
AST SERPL-CCNC: 24 U/L
BILIRUB SERPL-MCNC: 0.6 MG/DL
BUN SERPL-MCNC: 15 MG/DL
C TRACH RRNA SPEC QL NAA+PROBE: NOT DETECTED
C TRACH RRNA SPEC QL NAA+PROBE: NOT DETECTED
CALCIUM SERPL-MCNC: 9.8 MG/DL
CD3 CELLS # BLD: 3124 CELLS/UL
CD3 CELLS NFR BLD: 84 %
CD3+CD4+ CELLS # BLD: 822 CELLS/UL
CD3+CD4+ CELLS NFR BLD: 22 %
CD3+CD4+ CELLS/CD3+CD8+ CLL SPEC: 0.37 RATIO
CD3+CD8+ CELLS # SPEC: 2251 CELLS/UL
CD3+CD8+ CELLS NFR BLD: 61 %
CHLORIDE SERPL-SCNC: 99 MMOL/L
CHOLEST SERPL-MCNC: 179 MG/DL
CO2 SERPL-SCNC: 25 MMOL/L
CREAT SERPL-MCNC: 1.03 MG/DL
EGFR: 94 ML/MIN/1.73M2
ESTIMATED AVERAGE GLUCOSE: 349 MG/DL
GLUCOSE SERPL-MCNC: 302 MG/DL
HBA1C MFR BLD HPLC: 13.8 %
HCT VFR BLD CALC: 47.3 %
HCV RNA SERPL NAA+PROBE-LOG IU: NOT DETECTED LOGIU/ML
HDLC SERPL-MCNC: 29 MG/DL
HEPB DNA PCR INT: NOT DETECTED
HEPB DNA PCR LOG: NOT DETECTED LOGIU/ML
HEPC RNA INTERP: NOT DETECTED
HGB BLD-MCNC: 16.1 G/DL
HIV1 RNA # SERPL NAA+PROBE: ABNORMAL
HIV1 RNA # SERPL NAA+PROBE: ABNORMAL COPIES/ML
LDLC SERPL CALC-MCNC: 97 MG/DL
MCHC RBC-ENTMCNC: 28.9 PG
MCHC RBC-ENTMCNC: 34 GM/DL
MCV RBC AUTO: 84.9 FL
N GONORRHOEA RRNA SPEC QL NAA+PROBE: NOT DETECTED
N GONORRHOEA RRNA SPEC QL NAA+PROBE: NOT DETECTED
NONHDLC SERPL-MCNC: 150 MG/DL
PLATELET # BLD AUTO: 284 K/UL
POTASSIUM SERPL-SCNC: 4.3 MMOL/L
PROT SERPL-MCNC: 8.1 G/DL
RBC # BLD: 5.57 M/UL
RBC # FLD: 13.1 %
SODIUM SERPL-SCNC: 137 MMOL/L
SOURCE ANAL: NORMAL
SOURCE ORAL: NORMAL
TRIGL SERPL-MCNC: 313 MG/DL
VIRAL LOAD INTERP: NORMAL
VIRAL LOAD LOG: ABNORMAL LG COP/ML
WBC # FLD AUTO: 8.48 K/UL

## 2023-11-19 LAB
C TRACH RRNA SPEC QL NAA+PROBE: NOT DETECTED
N GONORRHOEA RRNA SPEC QL NAA+PROBE: NOT DETECTED
SOURCE AMPLIFICATION: NORMAL
T PALLIDUM AB SER QL IA: POSITIVE

## 2024-01-12 ENCOUNTER — RX RENEWAL (OUTPATIENT)
Age: 39
End: 2024-01-12

## 2024-01-19 ENCOUNTER — NON-APPOINTMENT (OUTPATIENT)
Age: 39
End: 2024-01-19

## 2024-02-11 ENCOUNTER — RX RENEWAL (OUTPATIENT)
Age: 39
End: 2024-02-11

## 2024-02-11 RX ORDER — FLASH GLUCOSE SENSOR
KIT MISCELLANEOUS
Qty: 2 | Refills: 6 | Status: ACTIVE | COMMUNITY
Start: 2022-07-26 | End: 1900-01-01

## 2024-02-11 RX ORDER — BICTEGRAVIR SODIUM, EMTRICITABINE, AND TENOFOVIR ALAFENAMIDE FUMARATE 50; 200; 25 MG/1; MG/1; MG/1
50-200-25 TABLET ORAL
Qty: 30 | Refills: 5 | Status: ACTIVE | COMMUNITY
Start: 2019-05-14 | End: 1900-01-01

## 2024-04-16 ENCOUNTER — NON-APPOINTMENT (OUTPATIENT)
Age: 39
End: 2024-04-16

## 2024-04-17 ENCOUNTER — RX RENEWAL (OUTPATIENT)
Age: 39
End: 2024-04-17

## 2024-04-17 ENCOUNTER — APPOINTMENT (OUTPATIENT)
Dept: INFECTIOUS DISEASE | Facility: CLINIC | Age: 39
End: 2024-04-17

## 2024-04-17 RX ORDER — ERGOCALCIFEROL 1.25 MG/1
1.25 MG CAPSULE, LIQUID FILLED ORAL
Qty: 4 | Refills: 5 | Status: ACTIVE | COMMUNITY
Start: 2018-04-18 | End: 1900-01-01

## 2024-04-17 RX ORDER — ASPIRIN 81 MG/1
81 TABLET, COATED ORAL
Qty: 30 | Refills: 5 | Status: ACTIVE | COMMUNITY
Start: 2021-08-22 | End: 1900-01-01

## 2024-04-18 ENCOUNTER — NON-APPOINTMENT (OUTPATIENT)
Age: 39
End: 2024-04-18

## 2024-06-03 ENCOUNTER — APPOINTMENT (OUTPATIENT)
Dept: INFECTIOUS DISEASE | Facility: CLINIC | Age: 39
End: 2024-06-03

## 2024-06-03 ENCOUNTER — NON-APPOINTMENT (OUTPATIENT)
Age: 39
End: 2024-06-03

## 2024-06-05 ENCOUNTER — NON-APPOINTMENT (OUTPATIENT)
Age: 39
End: 2024-06-05

## 2024-06-07 ENCOUNTER — NON-APPOINTMENT (OUTPATIENT)
Age: 39
End: 2024-06-07

## 2024-06-26 ENCOUNTER — RX RENEWAL (OUTPATIENT)
Age: 39
End: 2024-06-26

## 2024-06-26 RX ORDER — OLMESARTAN MEDOXOMIL AND HYDROCHLOROTHIAZIDE 40; 12.5 MG/1; MG/1
40-12.5 TABLET ORAL
Qty: 30 | Refills: 4 | Status: ACTIVE | COMMUNITY
Start: 2020-09-14 | End: 1900-01-01

## 2024-07-10 ENCOUNTER — NON-APPOINTMENT (OUTPATIENT)
Age: 39
End: 2024-07-10

## 2024-07-24 ENCOUNTER — RX RENEWAL (OUTPATIENT)
Age: 39
End: 2024-07-24

## 2024-08-22 ENCOUNTER — NON-APPOINTMENT (OUTPATIENT)
Age: 39
End: 2024-08-22

## 2024-08-22 ENCOUNTER — RX RENEWAL (OUTPATIENT)
Age: 39
End: 2024-08-22

## 2024-09-23 ENCOUNTER — NON-APPOINTMENT (OUTPATIENT)
Age: 39
End: 2024-09-23

## 2024-09-24 ENCOUNTER — APPOINTMENT (OUTPATIENT)
Dept: INFECTIOUS DISEASE | Facility: CLINIC | Age: 39
End: 2024-09-24
Payer: COMMERCIAL

## 2024-09-24 ENCOUNTER — LABORATORY RESULT (OUTPATIENT)
Age: 39
End: 2024-09-24

## 2024-09-24 VITALS
OXYGEN SATURATION: 98 % | DIASTOLIC BLOOD PRESSURE: 88 MMHG | HEIGHT: 69 IN | TEMPERATURE: 97.8 F | HEART RATE: 79 BPM | WEIGHT: 302 LBS | BODY MASS INDEX: 44.73 KG/M2 | SYSTOLIC BLOOD PRESSURE: 133 MMHG

## 2024-09-24 DIAGNOSIS — Z92.89 PERSONAL HISTORY OF OTHER MEDICAL TREATMENT: ICD-10-CM

## 2024-09-24 DIAGNOSIS — Z01.00 ENCOUNTER FOR EXAMINATION OF EYES AND VISION W/OUT ABNORMAL FINDINGS: ICD-10-CM

## 2024-09-24 DIAGNOSIS — B20 HUMAN IMMUNODEFICIENCY VIRUS [HIV] DISEASE: ICD-10-CM

## 2024-09-24 PROCEDURE — 99214 OFFICE O/P EST MOD 30 MIN: CPT

## 2024-09-25 LAB
ALBUMIN SERPL ELPH-MCNC: 4.8 G/DL
ALP BLD-CCNC: 85 U/L
ALT SERPL-CCNC: 41 U/L
ANION GAP SERPL CALC-SCNC: 16 MMOL/L
AST SERPL-CCNC: 25 U/L
BILIRUB SERPL-MCNC: 0.5 MG/DL
BUN SERPL-MCNC: 22 MG/DL
C TRACH RRNA SPEC QL NAA+PROBE: NOT DETECTED
CALCIUM SERPL-MCNC: 10.2 MG/DL
CD3 CELLS # BLD: 2855 CELLS/UL
CD3 CELLS NFR BLD: 80 %
CD3+CD4+ CELLS # BLD: 720 CELLS/UL
CD3+CD4+ CELLS NFR BLD: 20 %
CD3+CD4+ CELLS/CD3+CD8+ CLL SPEC: 0.34 RATIO
CD3+CD8+ CELLS # SPEC: 2089 CELLS/UL
CD3+CD8+ CELLS NFR BLD: 59 %
CHLORIDE SERPL-SCNC: 96 MMOL/L
CHOLEST SERPL-MCNC: 184 MG/DL
CO2 SERPL-SCNC: 24 MMOL/L
CREAT SERPL-MCNC: 1.12 MG/DL
EGFR: 85 ML/MIN/1.73M2
ESTIMATED AVERAGE GLUCOSE: 335 MG/DL
GLUCOSE SERPL-MCNC: 352 MG/DL
HBA1C MFR BLD HPLC: 13.3 %
HBV SURFACE AB SER QL: REACTIVE
HCT VFR BLD CALC: 45.1 %
HCV RNA SERPL NAA+PROBE-LOG IU: NOT DETECTED LOGIU/ML
HDLC SERPL-MCNC: 26 MG/DL
HEPATITIS A IGG ANTIBODY: NONREACTIVE
HEPC RNA INTERP: NOT DETECTED
HGB BLD-MCNC: 15.7 G/DL
HIV1 RNA # SERPL NAA+PROBE: 202
HIV1 RNA # SERPL NAA+PROBE: ABNORMAL
LDLC SERPL CALC-MCNC: 83 MG/DL
MCHC RBC-ENTMCNC: 28.3 PG
MCHC RBC-ENTMCNC: 34.8 GM/DL
MCV RBC AUTO: 81.3 FL
N GONORRHOEA RRNA SPEC QL NAA+PROBE: NOT DETECTED
NONHDLC SERPL-MCNC: 158 MG/DL
PLATELET # BLD AUTO: 301 K/UL
POTASSIUM SERPL-SCNC: 4.4 MMOL/L
PROT SERPL-MCNC: 8.1 G/DL
RBC # BLD: 5.55 M/UL
RBC # FLD: 13.2 %
SODIUM SERPL-SCNC: 135 MMOL/L
SOURCE AMPLIFICATION: NORMAL
SOURCE ANAL: NORMAL
SOURCE ORAL: NORMAL
TRIGL SERPL-MCNC: 461 MG/DL
VIRAL LOAD INTERP: NORMAL
VIRAL LOAD LOG: 2.31
WBC # FLD AUTO: 7.85 K/UL

## 2024-09-25 RX ORDER — SITAGLIPTIN AND METFORMIN HYDROCHLORIDE 50; 1000 MG/1; MG/1
50-1000 TABLET, FILM COATED ORAL TWICE DAILY
Qty: 60 | Refills: 1 | Status: ACTIVE | COMMUNITY
Start: 2024-09-24

## 2024-09-26 LAB — T PALLIDUM AB SER QL IA: POSITIVE

## 2024-09-27 NOTE — ASSESSMENT
[Treatment Education] : treatment education [Treatment Adherence] : treatment adherence [Rx Dose / Side Effects] : Rx dose/side effects [Risk Reduction] : risk reduction [Nutritional / Food Issues] : nutritional/food issues [Universal Precautions] : universal precautions [Medical Care Issues] : medical care issues [Drug Interactions / Side Effects] : drug interactions/side effects [Disclosure of Diagnosis] : disclosure of diagnosis [HIV Education] : HIV Education [Anticipatory Guidance] : anticipatory guidance [Education Materials Provided] : Eduction materials were provided [FreeTextEntry1] : 38yo man with a history of a recent presentation of advanced AIDs, but steadily improving with ARVs and OI meds  Sad about his Mom's passing and this has contributed to his weight gain, but he is back to work as a   Will continue the Biktarvy  Poor dentition needs multiple tooth extractions- dental referral has a private dentist  HIV- needs annual labs sent today, HGBA1c, lipids, will check COVID antibodies post vaccine  RPR- s/p treatment x 4 doses will need repeat titer today and may need further treatment  cryptosporidial diarrhea- sxs. improved with ARVs no proven effective primary therapy but if sxs. recur will try Nitazoxinide  HTN- needs diet and exercise and weight loss will see nutritiionist today but will also start a BP med Losartan-hctz and repeat BP in 1 week. If not effective at reducing will cotninue current anti HTN meds  needs nutritionist referral for diet and exercise weight loss program  May need Pulmonary referral for sleep apnea based upon physical appearance  Glucose is markedly elevated- needs referral back to Endocrine for support as well as nutrition for consult  RTC in 4 mo

## 2024-09-27 NOTE — HISTORY OF PRESENT ILLNESS
[FreeTextEntry1] : 38 yo man who presented with advanced AIds and multiple OIs a few years ago. He has been steadily improving since starting ARVs He has been taking Biktarvy without side effects noted  He has a history of confirmed crysptosporidial diarrhea- sxs. 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 COVID related  He needs follow up with Endocrine for his Diabetes management as well as Nutrition for weight loss counseling

## 2024-09-27 NOTE — HISTORY OF PRESENT ILLNESS
[FreeTextEntry1] : 38 yo man who presented with advanced AIds and multiple OIs a few years ago. He has been steadily improving since starting ARVs He has been taking Biktarvy without side effects noted  He has a history of confirmed crysptosporidial diarrhea- sxs. 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 COVID related  He needs follow up with Endocrine for his Diabetes management as well as Nutrition for weight loss counseling

## 2024-10-25 ENCOUNTER — RX RENEWAL (OUTPATIENT)
Age: 39
End: 2024-10-25

## 2024-11-22 ENCOUNTER — RX RENEWAL (OUTPATIENT)
Age: 39
End: 2024-11-22

## 2024-12-20 ENCOUNTER — NON-APPOINTMENT (OUTPATIENT)
Age: 39
End: 2024-12-20

## 2024-12-20 ENCOUNTER — RX RENEWAL (OUTPATIENT)
Age: 39
End: 2024-12-20

## 2025-01-14 ENCOUNTER — APPOINTMENT (OUTPATIENT)
Dept: INFECTIOUS DISEASE | Facility: CLINIC | Age: 40
End: 2025-01-14

## 2025-01-15 ENCOUNTER — RX RENEWAL (OUTPATIENT)
Age: 40
End: 2025-01-15

## 2025-01-15 ENCOUNTER — NON-APPOINTMENT (OUTPATIENT)
Age: 40
End: 2025-01-15

## 2025-02-13 ENCOUNTER — RX RENEWAL (OUTPATIENT)
Age: 40
End: 2025-02-13

## 2025-03-13 ENCOUNTER — RX RENEWAL (OUTPATIENT)
Age: 40
End: 2025-03-13

## 2025-03-25 ENCOUNTER — APPOINTMENT (OUTPATIENT)
Dept: INFECTIOUS DISEASE | Facility: CLINIC | Age: 40
End: 2025-03-25

## 2025-04-17 ENCOUNTER — NON-APPOINTMENT (OUTPATIENT)
Age: 40
End: 2025-04-17

## 2025-06-09 ENCOUNTER — APPOINTMENT (OUTPATIENT)
Dept: INFECTIOUS DISEASE | Facility: CLINIC | Age: 40
End: 2025-06-09
Payer: COMMERCIAL

## 2025-06-09 ENCOUNTER — APPOINTMENT (OUTPATIENT)
Dept: INFECTIOUS DISEASE | Facility: CLINIC | Age: 40
End: 2025-06-09

## 2025-06-09 ENCOUNTER — LABORATORY RESULT (OUTPATIENT)
Age: 40
End: 2025-06-09

## 2025-06-09 ENCOUNTER — NON-APPOINTMENT (OUTPATIENT)
Age: 40
End: 2025-06-09

## 2025-06-09 VITALS
BODY MASS INDEX: 39.48 KG/M2 | WEIGHT: 282 LBS | TEMPERATURE: 97.6 F | HEIGHT: 71 IN | SYSTOLIC BLOOD PRESSURE: 128 MMHG | HEART RATE: 92 BPM | OXYGEN SATURATION: 96 % | DIASTOLIC BLOOD PRESSURE: 92 MMHG

## 2025-06-09 PROBLEM — Z92.89 HISTORY OF DENTAL EXAMINATION: Status: RESOLVED | Noted: 2018-01-30 | Resolved: 2025-06-09

## 2025-06-09 PROBLEM — Z01.00 VISIT FOR EYE AND VISION EXAM: Status: RESOLVED | Noted: 2018-01-30 | Resolved: 2025-06-09

## 2025-06-09 PROCEDURE — G2211 COMPLEX E/M VISIT ADD ON: CPT | Mod: NC

## 2025-06-09 PROCEDURE — 99214 OFFICE O/P EST MOD 30 MIN: CPT

## 2025-06-09 RX ORDER — DOXYCYCLINE HYCLATE 100 MG/1
100 CAPSULE ORAL
Qty: 30 | Refills: 3 | Status: ACTIVE | COMMUNITY
Start: 2025-06-09 | End: 1900-01-01

## 2025-06-10 ENCOUNTER — NON-APPOINTMENT (OUTPATIENT)
Age: 40
End: 2025-06-10

## 2025-06-10 RX ORDER — TIRZEPATIDE 2.5 MG/.5ML
2.5 INJECTION, SOLUTION SUBCUTANEOUS
Qty: 1 | Refills: 1 | Status: ACTIVE | COMMUNITY
Start: 2025-06-09

## 2025-06-11 RX ORDER — DULAGLUTIDE 0.75 MG/.5ML
0.75 INJECTION, SOLUTION SUBCUTANEOUS
Qty: 1 | Refills: 0 | Status: ACTIVE | COMMUNITY
Start: 2025-06-10

## 2025-06-12 RX ORDER — METFORMIN HYDROCHLORIDE 1000 MG/1
1000 TABLET, COATED ORAL
Qty: 60 | Refills: 6 | Status: ACTIVE | COMMUNITY
Start: 2025-06-11

## 2025-07-01 ENCOUNTER — NON-APPOINTMENT (OUTPATIENT)
Age: 40
End: 2025-07-01

## 2025-07-14 ENCOUNTER — RX RENEWAL (OUTPATIENT)
Age: 40
End: 2025-07-14

## 2025-07-31 ENCOUNTER — APPOINTMENT (OUTPATIENT)
Dept: ENDOCRINOLOGY | Facility: CLINIC | Age: 40
End: 2025-07-31
Payer: COMMERCIAL

## 2025-07-31 VITALS
DIASTOLIC BLOOD PRESSURE: 84 MMHG | RESPIRATION RATE: 16 BRPM | WEIGHT: 299 LBS | OXYGEN SATURATION: 97 % | BODY MASS INDEX: 41.86 KG/M2 | HEIGHT: 71 IN | SYSTOLIC BLOOD PRESSURE: 117 MMHG | HEART RATE: 100 BPM

## 2025-07-31 DIAGNOSIS — E55.9 VITAMIN D DEFICIENCY, UNSPECIFIED: ICD-10-CM

## 2025-07-31 DIAGNOSIS — B20 HUMAN IMMUNODEFICIENCY VIRUS [HIV] DISEASE: ICD-10-CM

## 2025-07-31 DIAGNOSIS — E11.9 TYPE 2 DIABETES MELLITUS W/OUT COMPLICATIONS: ICD-10-CM

## 2025-07-31 PROCEDURE — 99204 OFFICE O/P NEW MOD 45 MIN: CPT

## 2025-08-01 LAB
ALBUMIN, RANDOM URINE: 3.7 MG/DL
CREAT SPEC-SCNC: 150 MG/DL
MICROALBUMIN/CREAT 24H UR-RTO: 24 MG/G

## 2025-08-04 ENCOUNTER — NON-APPOINTMENT (OUTPATIENT)
Age: 40
End: 2025-08-04

## 2025-08-06 RX ORDER — TIRZEPATIDE 2.5 MG/.5ML
2.5 INJECTION, SOLUTION SUBCUTANEOUS
Qty: 3 | Refills: 1 | Status: ACTIVE | COMMUNITY
Start: 2025-07-31